# Patient Record
Sex: FEMALE | Race: WHITE | NOT HISPANIC OR LATINO | Employment: OTHER | URBAN - METROPOLITAN AREA
[De-identification: names, ages, dates, MRNs, and addresses within clinical notes are randomized per-mention and may not be internally consistent; named-entity substitution may affect disease eponyms.]

---

## 2017-01-05 ENCOUNTER — TELEPHONE (OUTPATIENT)
Dept: PRIMARY CARE CLINIC | Facility: CLINIC | Age: 82
End: 2017-01-05

## 2017-01-05 NOTE — TELEPHONE ENCOUNTER
----- Message from Viv Dunham sent at 1/3/2017  2:07 PM CST -----  Contact: [phone]  Patient called to schedule a hospital follow up from [facility].  Reason for visit: COPD, was unable to breathe   The patient was discharged on 12/29/16  Patient was advised to follow up, she requesting to be seen by 1/4/16, only time her daughter can bring her in for the appt.  There are no available appointments for when the patient due to be seen in the clinic.  Please contact patient for scheduling purposes at phone number [phone].  Thanks!

## 2017-01-06 ENCOUNTER — OFFICE VISIT (OUTPATIENT)
Dept: FAMILY MEDICINE | Facility: CLINIC | Age: 82
End: 2017-01-06
Payer: MEDICARE

## 2017-01-06 ENCOUNTER — CLINICAL SUPPORT (OUTPATIENT)
Dept: AUDIOLOGY | Facility: CLINIC | Age: 82
End: 2017-01-06
Payer: MEDICARE

## 2017-01-06 VITALS — OXYGEN SATURATION: 97 % | SYSTOLIC BLOOD PRESSURE: 130 MMHG | DIASTOLIC BLOOD PRESSURE: 74 MMHG | HEART RATE: 100 BPM

## 2017-01-06 DIAGNOSIS — J44.1 COPD EXACERBATION: Primary | ICD-10-CM

## 2017-01-06 PROCEDURE — 99999 PR PBB SHADOW E&M-EST. PATIENT-LVL IV: CPT | Mod: PBBFAC,,, | Performed by: NURSE PRACTITIONER

## 2017-01-06 PROCEDURE — 99214 OFFICE O/P EST MOD 30 MIN: CPT | Mod: PBBFAC,PO | Performed by: NURSE PRACTITIONER

## 2017-01-06 PROCEDURE — 99213 OFFICE O/P EST LOW 20 MIN: CPT | Mod: S$PBB,,, | Performed by: NURSE PRACTITIONER

## 2017-01-06 RX ORDER — AZELASTINE 1 MG/ML
1 SPRAY, METERED NASAL 2 TIMES DAILY
Qty: 30 ML | Refills: 0 | Status: SHIPPED | OUTPATIENT
Start: 2017-01-06 | End: 2017-01-06 | Stop reason: SDUPTHER

## 2017-01-06 RX ORDER — FLUTICASONE PROPIONATE 50 MCG
2 SPRAY, SUSPENSION (ML) NASAL DAILY
Qty: 16 G | Refills: 0 | Status: SHIPPED | OUTPATIENT
Start: 2017-01-06 | End: 2017-01-06 | Stop reason: SDUPTHER

## 2017-01-06 RX ORDER — FLUTICASONE PROPIONATE 50 MCG
2 SPRAY, SUSPENSION (ML) NASAL DAILY
Qty: 16 G | Refills: 0 | Status: SHIPPED | OUTPATIENT
Start: 2017-01-06 | End: 2017-01-17 | Stop reason: SDUPTHER

## 2017-01-06 RX ORDER — GUAIFENESIN 1200 MG/1
1200 TABLET, EXTENDED RELEASE ORAL 2 TIMES DAILY
Qty: 20 TABLET | Refills: 0 | COMMUNITY
Start: 2017-01-06 | End: 2017-01-16

## 2017-01-06 RX ORDER — ALLOPURINOL 100 MG/1
TABLET ORAL
Qty: 90 TABLET | Refills: 3 | Status: SHIPPED | OUTPATIENT
Start: 2017-01-06 | End: 2018-01-24 | Stop reason: SDUPTHER

## 2017-01-06 RX ORDER — AZELASTINE 1 MG/ML
1 SPRAY, METERED NASAL 2 TIMES DAILY
Qty: 30 ML | Refills: 0 | Status: SHIPPED | OUTPATIENT
Start: 2017-01-06 | End: 2017-01-17 | Stop reason: SDUPTHER

## 2017-01-06 RX ORDER — GUAIFENESIN 1200 MG/1
1200 TABLET, EXTENDED RELEASE ORAL 2 TIMES DAILY
Qty: 20 TABLET | Refills: 0 | COMMUNITY
Start: 2017-01-06 | End: 2017-01-06 | Stop reason: SDUPTHER

## 2017-01-06 NOTE — PROGRESS NOTES
Subjective:       Patient ID: Bel Oquendo is a 84 y.o. female.    Chief Complaint: Cough; Shortness of Breath; and Ear Fullness    HPI Comments: Patient was seen at the ED 12/29/16 at [facility] after having dry cough, congsetion, and shortness of breath. CXR showed no pneumonia, no fluid consolidation, and normal ECG. Treated with doxycycline and medrol dose pack. Improved, still some coughing. Still having some shortness of breath, still midlly worse due to her congestion. Chronic oxygen dependency due to sever COPD, Dr Acuña is Pulmonologist.     TETANUS VACCINE due on 08/06/1950  DEXA SCAN due on 01/13/2013    Past Medical History:  Past Medical History:    COPD (chronic obstructive pulmonary disease)                  COPD (chronic obstructive pulmonary disease)    5/4/2012      JIANG (dyspnea on exertion)                                     HEARING LOSS                                                    Comment:bilateral aids    Hypertension                                                  Mobility impaired                                               Comment:uses walker     Pericardial effusion                            07/2015         Comment:resolved    Supplemental oxygen dependent                                   Comment:3L O2 via [place]    Tobacco use                                     6/29/2012     Type II diabetes mellitus                                   Past Surgical History:    CEREBRAL ANEURYSM REPAIR                         1996            Comment:Dr. Mercado    CHOLECYSTECTOMY                                 N/A March 26,*    CATARACT EXTRACTION                             Left 2001          VAGINAL DELIVERY                                                 Comment:x7    DILATION AND CURETTAGE OF UTERUS                                 Comment:x2    PERICARDIOCENTESIS                                               Comment:effusion/tamponade  Review of patient's allergies indicates:  No Known  Allergies  Current Outpatient Prescriptions on File Prior to Visit:  aspirin (ECOTRIN) 81 MG EC tablet, Every day, Disp: , Rfl:   BD ULTRA-FINE II LANCETS 30 gauge Misc, USE ONCE DAILY, Disp: 100 each, Rfl: 3  CONTOUR NEXT STRIPS Strp, USE ONE STRIP DAILY TO TEST BLOOD SUGAR, Disp: 100 strip, Rfl: 0  fish oil-omega-3 fatty acids 300-1,000 mg capsule, Take 2 g by mouth once daily.  , Disp: , Rfl:   fluticasone-salmeterol 100-50 mcg/dose (ADVAIR) 100-50 mcg/dose diskus inhaler, Inhale 1 puff into the lungs 2 (two) times daily., Disp: 180 each, Rfl: 3  furosemide (LASIX) 20 MG tablet, TAKE 1 TABLET DAILY, Disp: 90 tablet, Rfl: 2  glipiZIDE (GLUCOTROL) 5 MG TR24, TAKE 1 TABLET DAILY WITH BREAKFAST, Disp: 90 tablet, Rfl: 3  GLUCOSAM HCL/MSM/CHONDRO SCRUGGS A (GLUCOSAMINE HCL-MSM-CHONDROITN) 400-200-333 mg Tab, Every day, Disp: , Rfl:   ketorolac 0.5% (ACULAR) 0.5 % Drop, Place 1 drop into the right eye 3 (three) times daily., Disp: 5 mL, Rfl: 1  lisinopril (PRINIVIL,ZESTRIL) 2.5 MG tablet, Take 2.5 mg by mouth once daily., Disp: , Rfl:   methylPREDNISolone (MEDROL DOSEPACK) 4 mg tablet, Take as directed, Disp: 1 Package, Rfl: 0  MILK THISTLE ORAL, Take 1 tablet by mouth once daily. , Disp: , Rfl:   multivitamin (THERAGRAN) per tablet, Take 1 tablet by mouth once daily., Disp: , Rfl:   nystatin (MYCOSTATIN) powder, Apply topically 4 (four) times daily., Disp: 60 g, Rfl: 2  pantoprazole (PROTONIX) 40 MG tablet, TAKE 1 TABLET DAILY, Disp: 90 tablet, Rfl: 1  pravastatin (PRAVACHOL) 40 MG tablet, TAKE 1 TABLET DAILY, Disp: 90 tablet, Rfl: 2  PROAIR HFA 90 mcg/actuation inhaler, USE 1 TO 2 INHALATIONS EVERY 6 HOURS AS NEEDED FOR WHEEZING OR SHORTNESS OF BREATH, Disp: 25.5 g, Rfl: 1  SPIRIVA WITH HANDIHALER 18 mcg inhalation capsule, INHALE THE CONTENTS OF 1 CAPSULE DAILY, Disp: 90 capsule, Rfl: 3  vitamin D 1000 units Tab, , Disp: , Rfl: 3  triamcinolone acetonide 0.1% (KENALOG) 0.1 % cream, Apply topically 2 (two) times daily.,  Disp: 45 g, Rfl: 0  (DISCONTINUED) allopurinol (ZYLOPRIM) 100 MG tablet, TAKE 1 TABLET DAILY, Disp: 90 tablet, Rfl: 1  (DISCONTINUED) doxycycline (VIBRAMYCIN) 100 MG Cap, Take 1 capsule (100 mg total) by mouth 2 (two) times daily., Disp: 14 capsule, Rfl: 0    No current facility-administered medications on file prior to visit.     Social History    Marital status:              Spouse name:                       Years of education:                 Number of children: 6             Occupational History    None on file    Social History Main Topics    Smoking status: Former Smoker                                                                Packs/day: 0.50      Years: 0.00           Types: Cigarettes       Quit date: 12/27/2012    Smokeless status: Never Used                        Comment: quit 2 week ago    Alcohol use: No                 Comment: Minimal exposure    Drug use: No              Sexual activity: No                   Other Topics            Concern    None on file    Social History Narrative    Lives at home, independently.   Home oxygen and walker.      Review of patient's family history indicates:    Heart disease                  Father                          Review of Systems   Constitutional: Positive for fatigue. Negative for chills and fever.   HENT: Positive for postnasal drip and rhinorrhea.    Respiratory: Positive for cough and shortness of breath. Negative for chest tightness, wheezing and stridor.    Cardiovascular: Negative.  Negative for chest pain, palpitations and leg swelling.   Gastrointestinal: Negative.  Negative for diarrhea, nausea and vomiting.       Objective:      Physical Exam   Constitutional: She is oriented to person, place, and time. No distress.   HENT:   Head: Normocephalic and atraumatic.   Eyes: Pupils are equal, round, and reactive to light.   Cardiovascular: Normal rate and regular rhythm.    Pulmonary/Chest: She has wheezes.   Oxygen depenedent    Abdominal: Soft. Bowel sounds are normal. She exhibits no distension. There is no tenderness.   Musculoskeletal: She exhibits no edema.   Neurological: She is alert and oriented to person, place, and time.   Skin: She is not diaphoretic. No erythema.   Psychiatric: She has a normal mood and affect. Her behavior is normal.   Vitals reviewed.      Assessment:       1. COPD exacerbation        Plan:       Symptoms improving. Treat with Astelin and Flonase for sinus drip and drainage, add mucinex, continue Spirva.

## 2017-01-06 NOTE — MR AVS SNAPSHOT
[facility]  [address]  [facility] [place] [address]  Phone: [phone]  Fax: [phone]                  Bel Oquendo   2017 2:00 PM   Office Visit    Description:  Female : 1932   Provider:  Cinthia Vizcarra NP   Department:  [facility]           Reason for Visit     Cough     Shortness of Breath     Ear Fullness           Diagnoses this Visit        Comments    COPD exacerbation    -  Primary            To Do List           Goals (5 Years of Data)              Today    16    Blood Pressure < 130/80   130/74  157/68  165/66    HEMOGLOBIN A1C < 7.0              These Medications        Disp Refills Start End    azelastine (ASTELIN) 137 mcg (0.1 %) nasal spray 30 mL 0 2017    1 spray (137 mcg total) by Nasal route 2 (two) times daily. - Nasal    Pharmacy: [facility] [address] [place] [address] AT [place] [address] & [place] [address] Ph #: [phone]       fluticasone (FLONASE) 50 mcg/actuation nasal spray 16 g 0 2017     2 sprays by Each Nare route once daily. - Each Nare    Pharmacy: [facility] [address] [place] [address] AT [facility] & [place] [address] Ph #: [phone]         PURCHASE these Medications (No prescription required)        Start End    guaifenesin (MUCINEX) 1,200 mg Ta12 2017    Sig - Route: Take 1,200 mg by mouth 2 (two) times daily. - Oral    Class: OTC      [facility]     [facility] On Call Nurse Care Line -  Assistance  Registered nurses in the [facility] On Call Center provide clinical advisement, health education, appointment booking, and other advisory services.  Call for this free service at [phone].             Medications           Message regarding Medications     Verify the changes and/or additions to your medication regime listed below are the same as discussed with your clinician today.  If any of these changes or additions are  incorrect, please notify your healthcare provider.        START taking these NEW medications        Refills    azelastine (ASTELIN) 137 mcg (0.1 %) nasal spray 0    Si spray (137 mcg total) by Nasal route 2 (two) times daily.    Class: Normal    Route: Nasal    fluticasone (FLONASE) 50 mcg/actuation nasal spray 0    Si sprays by Each Nare route once daily.    Class: Normal    Route: Each Nare    guaifenesin (MUCINEX) 1,200 mg Ta12 0    Sig: Take 1,200 mg by mouth 2 (two) times daily.    Class: OTC    Route: Oral      STOP taking these medications     doxycycline (VIBRAMYCIN) 100 MG Cap Take 1 capsule (100 mg total) by mouth 2 (two) times daily.    methylPREDNISolone (MEDROL DOSEPACK) 4 mg tablet Take as directed           Verify that the below list of medications is an accurate representation of the medications you are currently taking.  If none reported, the list may be blank. If incorrect, please contact your healthcare provider. Carry this list with you in case of emergency.           Current Medications     allopurinol (ZYLOPRIM) 100 MG tablet TAKE 1 TABLET DAILY    aspirin (ECOTRIN) 81 MG EC tablet Every day    BD ULTRA-FINE II LANCETS 30 gauge Misc USE ONCE DAILY    CONTOUR NEXT STRIPS Strp USE ONE STRIP DAILY TO TEST BLOOD SUGAR    fish oil-omega-3 fatty acids 300-1,000 mg capsule Take 2 g by mouth once daily.      fluticasone-salmeterol 100-50 mcg/dose (ADVAIR) 100-50 mcg/dose diskus inhaler Inhale 1 puff into the lungs 2 (two) times daily.    furosemide (LASIX) 20 MG tablet TAKE 1 TABLET DAILY    glipiZIDE (GLUCOTROL) 5 MG TR24 TAKE 1 TABLET DAILY WITH BREAKFAST    GLUCOSAM HCL/MSM/CHONDRO SCRUGGS A (GLUCOSAMINE HCL-MSM-CHONDROITN) 400-200-333 mg Tab Every day    ketorolac 0.5% (ACULAR) 0.5 % Drop Place 1 drop into the right eye 3 (three) times daily.    lisinopril (PRINIVIL,ZESTRIL) 2.5 MG tablet Take 2.5 mg by mouth once daily.    MILK THISTLE ORAL Take 1 tablet by mouth once daily.     multivitamin  (THERAGRAN) per tablet Take 1 tablet by mouth once daily.    nystatin (MYCOSTATIN) powder Apply topically 4 (four) times daily.    pantoprazole (PROTONIX) 40 MG tablet TAKE 1 TABLET DAILY    pravastatin (PRAVACHOL) 40 MG tablet TAKE 1 TABLET DAILY    PROAIR HFA 90 mcg/actuation inhaler USE 1 TO 2 INHALATIONS EVERY 6 HOURS AS NEEDED FOR WHEEZING OR SHORTNESS OF BREATH    SPIRIVA WITH HANDIHALER 18 mcg inhalation capsule INHALE THE CONTENTS OF 1 CAPSULE DAILY    vitamin D 1000 units Tab     azelastine (ASTELIN) 137 mcg (0.1 %) nasal spray 1 spray (137 mcg total) by Nasal route 2 (two) times daily.    fluticasone (FLONASE) 50 mcg/actuation nasal spray 2 sprays by Each Nare route once daily.    guaifenesin (MUCINEX) 1,200 mg Ta12 Take 1,200 mg by mouth 2 (two) times daily.    triamcinolone acetonide 0.1% (KENALOG) 0.1 % cream Apply topically 2 (two) times daily.           Clinical Reference Information           Vital Signs - Last Recorded  Most recent update: 1/6/2017  1:57 PM by Chetna Cooper LPN    BP Pulse                130/74 100          Blood Pressure          Most Recent Value    BP  130/74      Allergies as of 1/6/2017     No Known Allergies      Immunizations Administered on Date of Encounter - 1/6/2017     None

## 2017-01-09 NOTE — PROGRESS NOTES
The patient was seen in the hearing aid clinic accompanied by her son.  The patient reported both of her hearing aids were not working.  A listening check of each aid indicated the hearing aids would turn on, but not provide amplification.  [facility]  The patient requested a RUSH be placed on the repair.  A loaner hearing aid for each ear was programmed and issued to the patient for her to use while her hearing aids are out for repair.  The patient was instructed on care and use of the loaner DERICK hearing aids.  The patient should be contacted upon receipt of her repaired hearing aids.

## 2017-01-13 ENCOUNTER — CLINICAL SUPPORT (OUTPATIENT)
Dept: AUDIOLOGY | Facility: CLINIC | Age: 82
End: 2017-01-13

## 2017-01-13 PROCEDURE — V5299 HEARING SERVICE: HCPCS | Mod: CSM,,, | Performed by: AUDIOLOGIST

## 2017-01-13 NOTE — PROGRESS NOTES
Patient was seen in the hearing aid clinic, after picking up her repaired hearing aids.  Patient reported she could not hear with her repaired hearing aids.  Listening check of the right and left aid revealed each aid to sound good.  Tympanometry revealed Type B tympanogram AU.  A medical evaluation was recommended.  The patient scheduled an appointment with our nurse practitioner.  The programming on the right and left hearing aid was changed to allow for increased gain in the hearing aids.  The programming on the left and right hearing aid should be recalculated after medical follow-up.  The patient was informed of this recommendation.

## 2017-01-13 NOTE — PROGRESS NOTES
The patient was not seen in the clinic at this time.  Patient picked up repaired hearing aids and paid RUSH fee, at .

## 2017-01-17 ENCOUNTER — OFFICE VISIT (OUTPATIENT)
Dept: FAMILY MEDICINE | Facility: CLINIC | Age: 82
End: 2017-01-17
Payer: MEDICARE

## 2017-01-17 VITALS
WEIGHT: 199 LBS | OXYGEN SATURATION: 94 % | DIASTOLIC BLOOD PRESSURE: 60 MMHG | HEIGHT: 62 IN | BODY MASS INDEX: 36.62 KG/M2 | HEART RATE: 70 BPM | SYSTOLIC BLOOD PRESSURE: 108 MMHG

## 2017-01-17 DIAGNOSIS — H91.90 DECREASED HEARING, UNSPECIFIED LATERALITY: Primary | ICD-10-CM

## 2017-01-17 PROCEDURE — 99213 OFFICE O/P EST LOW 20 MIN: CPT | Mod: S$PBB,,, | Performed by: NURSE PRACTITIONER

## 2017-01-17 PROCEDURE — 99214 OFFICE O/P EST MOD 30 MIN: CPT | Mod: PBBFAC,PO | Performed by: NURSE PRACTITIONER

## 2017-01-17 PROCEDURE — 99999 PR PBB SHADOW E&M-EST. PATIENT-LVL IV: CPT | Mod: PBBFAC,,, | Performed by: NURSE PRACTITIONER

## 2017-01-17 RX ORDER — FLUTICASONE PROPIONATE 50 MCG
2 SPRAY, SUSPENSION (ML) NASAL DAILY
Qty: 16 G | Refills: 0 | Status: SHIPPED | OUTPATIENT
Start: 2017-01-17 | End: 2017-01-27 | Stop reason: SDUPTHER

## 2017-01-17 RX ORDER — FLUTICASONE PROPIONATE 50 MCG
2 SPRAY, SUSPENSION (ML) NASAL DAILY
Qty: 16 G | Refills: 0 | Status: SHIPPED | OUTPATIENT
Start: 2017-01-17 | End: 2017-01-17 | Stop reason: SDUPTHER

## 2017-01-17 RX ORDER — AZELASTINE 1 MG/ML
1 SPRAY, METERED NASAL 2 TIMES DAILY
Qty: 30 ML | Refills: 0 | Status: SHIPPED | OUTPATIENT
Start: 2017-01-17 | End: 2017-01-27 | Stop reason: SDUPTHER

## 2017-01-17 RX ORDER — AZELASTINE 1 MG/ML
1 SPRAY, METERED NASAL 2 TIMES DAILY
Qty: 30 ML | Refills: 0 | Status: SHIPPED | OUTPATIENT
Start: 2017-01-17 | End: 2017-01-17 | Stop reason: SDUPTHER

## 2017-01-17 NOTE — PROGRESS NOTES
Subjective:       Patient ID: Bel Oquendo is a 84 y.o. female.    Chief Complaint: Fluid in ears (Has been to Audiology)    HPI Comments: Patient has had decreased hearing and feeling of fullness in her ears since she was traeted for sinus and chest congestion on 12/29/16. She was seen by audiology on 1/6/17 and again on 1/13/17 due to inability to hear even after hearting aids were adjusted. She has an appointment scheduled with ENT on 1/27/17.  She says she was told to follow up anna PCP. She is using flonase and astelin and mucinex daily, she says that her sinus congestion has resolved but her hearing is not much better.  TETANUS VACCINE due on 08/06/1950  DEXA SCAN due on 01/13/2013    Past Medical History:  Past Medical History:    COPD (chronic obstructive pulmonary disease)                  COPD (chronic obstructive pulmonary disease)    5/4/2012      JIANG (dyspnea on exertion)                                     HEARING LOSS                                                    Comment:bilateral aids    Hypertension                                                  Mobility impaired                                               Comment:uses walker     Pericardial effusion                            07/2015         Comment:resolved    Supplemental oxygen dependent                                   Comment:3L O2 via [place]    Tobacco use                                     6/29/2012     Type II diabetes mellitus                                   Past Surgical History:    CEREBRAL ANEURYSM REPAIR                         1996            Comment:Dr. Mercado    CHOLECYSTECTOMY                                 N/A March 26,*    CATARACT EXTRACTION                             Left 2001          VAGINAL DELIVERY                                                 Comment:x7    DILATION AND CURETTAGE OF UTERUS                                 Comment:x2    PERICARDIOCENTESIS                                                Comment:effusion/tamponade  Review of patient's allergies indicates:  No Known Allergies  Current Outpatient Prescriptions on File Prior to Visit:  allopurinol (ZYLOPRIM) 100 MG tablet, TAKE 1 TABLET DAILY, Disp: 90 tablet, Rfl: 3  aspirin (ECOTRIN) 81 MG EC tablet, Every day, Disp: , Rfl:   BD ULTRA-FINE II LANCETS 30 gauge Misc, USE ONCE DAILY, Disp: 100 each, Rfl: 3  CONTOUR NEXT STRIPS Strp, USE ONE STRIP DAILY TO TEST BLOOD SUGAR, Disp: 100 strip, Rfl: 0  fish oil-omega-3 fatty acids 300-1,000 mg capsule, Take 2 g by mouth once daily.  , Disp: , Rfl:   fluticasone-salmeterol 100-50 mcg/dose (ADVAIR) 100-50 mcg/dose diskus inhaler, Inhale 1 puff into the lungs 2 (two) times daily., Disp: 180 each, Rfl: 3  furosemide (LASIX) 20 MG tablet, TAKE 1 TABLET DAILY, Disp: 90 tablet, Rfl: 2  glipiZIDE (GLUCOTROL) 5 MG TR24, TAKE 1 TABLET DAILY WITH BREAKFAST, Disp: 90 tablet, Rfl: 3  GLUCOSAM HCL/MSM/CHONDRO SCRUGGS A (GLUCOSAMINE HCL-MSM-CHONDROITN) 400-200-333 mg Tab, Every day, Disp: , Rfl:   ketorolac 0.5% (ACULAR) 0.5 % Drop, Place 1 drop into the right eye 3 (three) times daily., Disp: 5 mL, Rfl: 1  lisinopril (PRINIVIL,ZESTRIL) 2.5 MG tablet, Take 2.5 mg by mouth once daily., Disp: , Rfl:   MILK THISTLE ORAL, Take 1 tablet by mouth once daily. , Disp: , Rfl:   multivitamin (THERAGRAN) per tablet, Take 1 tablet by mouth once daily., Disp: , Rfl:   nystatin (MYCOSTATIN) powder, Apply topically 4 (four) times daily., Disp: 60 g, Rfl: 2  pantoprazole (PROTONIX) 40 MG tablet, TAKE 1 TABLET DAILY, Disp: 90 tablet, Rfl: 1  pravastatin (PRAVACHOL) 40 MG tablet, TAKE 1 TABLET DAILY, Disp: 90 tablet, Rfl: 2  PROAIR HFA 90 mcg/actuation inhaler, USE 1 TO 2 INHALATIONS EVERY 6 HOURS AS NEEDED FOR WHEEZING OR SHORTNESS OF BREATH, Disp: 25.5 g, Rfl: 1  SPIRIVA WITH HANDIHALER 18 mcg inhalation capsule, INHALE THE CONTENTS OF 1 CAPSULE DAILY, Disp: 90 capsule, Rfl: 3  vitamin D 1000 units Tab, , Disp: , Rfl: 3  (DISCONTINUED)  azelastine (ASTELIN) 137 mcg (0.1 %) nasal spray, 1 spray (137 mcg total) by Nasal route 2 (two) times daily., Disp: 30 mL, Rfl: 0  (DISCONTINUED) fluticasone (FLONASE) 50 mcg/actuation nasal spray, 2 sprays by Each Nare route once daily., Disp: 16 g, Rfl: 0  () guaifenesin (MUCINEX) 1,200 mg Ta12, Take 1,200 mg by mouth 2 (two) times daily., Disp: 20 tablet, Rfl: 0  triamcinolone acetonide 0.1% (KENALOG) 0.1 % cream, Apply topically 2 (two) times daily., Disp: 45 g, Rfl: 0    No current facility-administered medications on file prior to visit.     Social History    Marital status:              Spouse name:                       Years of education:                 Number of children: 6             Occupational History    None on file    Social History Main Topics    Smoking status: Former Smoker                                                                Packs/day: 0.50      Years: 0.00           Types: Cigarettes       Quit date: 2012    Smokeless status: Never Used                        Comment: quit 2 week ago    Alcohol use: No                 Comment: Minimal exposure    Drug use: No              Sexual activity: No                   Other Topics            Concern    None on file    Social History Narrative    Lives at home, independently.   Home oxygen and walker.      Review of patient's family history indicates:    Heart disease                  Father                          Review of Systems   Constitutional: Negative for chills and fever.   HENT: Positive for hearing loss. Negative for ear pain.    Neurological: Negative for dizziness, light-headedness and headaches.       Objective:      Physical Exam   Constitutional: She is oriented to person, place, and time. No distress.   HENT:   Head: Normocephalic and atraumatic. Head is without raccoon's eyes.   Right Ear: No swelling or tenderness. Tympanic membrane is not erythematous. No middle ear effusion.   Left Ear: No swelling or  tenderness. Tympanic membrane is not erythematous. A middle ear effusion is present.   Nose: No mucosal edema or rhinorrhea. Right sinus exhibits no maxillary sinus tenderness and no frontal sinus tenderness. Left sinus exhibits no maxillary sinus tenderness and no frontal sinus tenderness.   Mouth/Throat: No posterior oropharyngeal erythema.   Cardiovascular: Normal rate.    Pulmonary/Chest: Effort normal.   Oxygen dependent   Neurological: She is alert and oriented to person, place, and time.   Skin: No rash noted. She is not diaphoretic.   Psychiatric: She has a normal mood and affect. Her behavior is normal.   Vitals reviewed.      Assessment:       1. Decreased hearing, unspecified laterality        Plan:       1. Decreased hearing, unspecified laterality  Continue astelin, flonase, and mucinex. Follow up with ENT as scheduled.

## 2017-01-17 NOTE — MR AVS SNAPSHOT
[facility]  [address] [facility] [address]  [facility] [address]  Phone: [phone]  Fax: [phone]                  Bel Oquendo   2017 1:00 PM   Office Visit    Description:  Female : 1932   Provider:  Cinthia Vizcarra NP   Department:  [facility]           Reason for Visit     Fluid in ears                To Do List           Future Appointments        Provider Department Dept Phone    2017 10:40 AM Liya Oneill NP [place] - [phone]    2017 11:00 AM AUDIO, [facility] Audiology [phone]    2017 1:00 PM HEARING, [facility] Hearing Aids [phone]      Goals (5 Years of Data)              Today    17    Blood Pressure < 130/80   108/60  130/74  157/68    HEMOGLOBIN A1C < 7.0              These Medications        Disp Refills Start End    azelastine (ASTELIN) 137 mcg (0.1 %) nasal spray 30 mL 0 2017    1 spray (137 mcg total) by Nasal route 2 (two) times daily. - Nasal    Pharmacy: [facility] [address] [place] [address] AT [facility] [address] [facility] Ph #: [phone]       fluticasone (FLONASE) 50 mcg/actuation nasal spray 16 g 0 2017     2 sprays by Each Nare route once daily. - Each Nare    Pharmacy: [facility] [address] [place] [address] AT [facility] Ph #: [phone]         [facility] On [facility]     [facility] -  Assistance  Registered nurses in the [facility] provide clinical advisement, health education, appointment booking, and other advisory services.  Call for this free service at [phone].             Medications           Message regarding Medications     Verify the changes and/or additions to your medication regime listed below are the same as discussed with your clinician today.  If any of these changes or additions are incorrect, please notify your healthcare  provider.             Verify that the below list of medications is an accurate representation of the medications you are currently taking.  If none reported, the list may be blank. If incorrect, please contact your healthcare provider. Carry this list with you in case of emergency.           Current Medications     allopurinol (ZYLOPRIM) 100 MG tablet TAKE 1 TABLET DAILY    aspirin (ECOTRIN) 81 MG EC tablet Every day    azelastine (ASTELIN) 137 mcg (0.1 %) nasal spray 1 spray (137 mcg total) by Nasal route 2 (two) times daily.    BD ULTRA-FINE II LANCETS 30 gauge Misc USE ONCE DAILY    CONTOUR NEXT STRIPS Strp USE ONE STRIP DAILY TO TEST BLOOD SUGAR    fish oil-omega-3 fatty acids 300-1,000 mg capsule Take 2 g by mouth once daily.      fluticasone (FLONASE) 50 mcg/actuation nasal spray 2 sprays by Each Nare route once daily.    fluticasone-salmeterol 100-50 mcg/dose (ADVAIR) 100-50 mcg/dose diskus inhaler Inhale 1 puff into the lungs 2 (two) times daily.    furosemide (LASIX) 20 MG tablet TAKE 1 TABLET DAILY    glipiZIDE (GLUCOTROL) 5 MG TR24 TAKE 1 TABLET DAILY WITH BREAKFAST    GLUCOSAM HCL/MSM/CHONDRO SCRUGGS A (GLUCOSAMINE HCL-MSM-CHONDROITN) 400-200-333 mg Tab Every day    ketorolac 0.5% (ACULAR) 0.5 % Drop Place 1 drop into the right eye 3 (three) times daily.    lisinopril (PRINIVIL,ZESTRIL) 2.5 MG tablet Take 2.5 mg by mouth once daily.    MILK THISTLE ORAL Take 1 tablet by mouth once daily.     multivitamin (THERAGRAN) per tablet Take 1 tablet by mouth once daily.    nystatin (MYCOSTATIN) powder Apply topically 4 (four) times daily.    pantoprazole (PROTONIX) 40 MG tablet TAKE 1 TABLET DAILY    pravastatin (PRAVACHOL) 40 MG tablet TAKE 1 TABLET DAILY    PROAIR HFA 90 mcg/actuation inhaler USE 1 TO 2 INHALATIONS EVERY 6 HOURS AS NEEDED FOR WHEEZING OR SHORTNESS OF BREATH    SPIRIVA WITH HANDIHALER 18 mcg inhalation capsule INHALE THE CONTENTS OF 1 CAPSULE DAILY    vitamin D 1000 units Tab     triamcinolone  "acetonide 0.1% (KENALOG) 0.1 % cream Apply topically 2 (two) times daily.           Clinical Reference Information           Vital Signs - Last Recorded  Most recent update: 1/17/2017  1:09 PM by Krissy L. Sandifer, LPN    BP Pulse Ht Wt SpO2 BMI    108/60 70 5' 2" (1.575 m) 90.3 kg (199 lb) (!) 94% 36.4 kg/m2      Blood Pressure          Most Recent Value    BP  108/60      Allergies as of 1/17/2017     No Known Allergies      Immunizations Administered on Date of Encounter - 1/17/2017     None      "

## 2017-01-27 ENCOUNTER — OFFICE VISIT (OUTPATIENT)
Dept: OTOLARYNGOLOGY | Facility: CLINIC | Age: 82
End: 2017-01-27
Payer: MEDICARE

## 2017-01-27 VITALS — HEIGHT: 61 IN

## 2017-01-27 DIAGNOSIS — Z97.4 WEARS HEARING AID: ICD-10-CM

## 2017-01-27 DIAGNOSIS — H61.23 BILATERAL IMPACTED CERUMEN: ICD-10-CM

## 2017-01-27 DIAGNOSIS — H69.92 EUSTACHIAN TUBE DYSFUNCTION, LEFT: ICD-10-CM

## 2017-01-27 DIAGNOSIS — H65.192 ACUTE NON-SUPPURATIVE OTITIS MEDIA, LEFT: Primary | ICD-10-CM

## 2017-01-27 DIAGNOSIS — H90.3 SENSORINEURAL HEARING LOSS (SNHL) OF BOTH EARS: ICD-10-CM

## 2017-01-27 PROCEDURE — 99999 PR PBB SHADOW E&M-EST. PATIENT-LVL IV: CPT | Mod: PBBFAC,,, | Performed by: NURSE PRACTITIONER

## 2017-01-27 PROCEDURE — 69210 REMOVE IMPACTED EAR WAX UNI: CPT | Mod: S$PBB,,, | Performed by: NURSE PRACTITIONER

## 2017-01-27 PROCEDURE — 99214 OFFICE O/P EST MOD 30 MIN: CPT | Mod: PBBFAC,PO | Performed by: NURSE PRACTITIONER

## 2017-01-27 PROCEDURE — 69210 REMOVE IMPACTED EAR WAX UNI: CPT | Mod: PBBFAC,PO | Performed by: NURSE PRACTITIONER

## 2017-01-27 PROCEDURE — 99213 OFFICE O/P EST LOW 20 MIN: CPT | Mod: 25,S$PBB,, | Performed by: NURSE PRACTITIONER

## 2017-01-27 RX ORDER — AZELASTINE 1 MG/ML
1 SPRAY, METERED NASAL 2 TIMES DAILY
Qty: 30 ML | Refills: 12 | Status: ON HOLD | OUTPATIENT
Start: 2017-01-27 | End: 2020-08-19 | Stop reason: HOSPADM

## 2017-01-27 RX ORDER — FLUTICASONE PROPIONATE 50 MCG
1 SPRAY, SUSPENSION (ML) NASAL 2 TIMES DAILY
Qty: 16 G | Refills: 12 | Status: SHIPPED | OUTPATIENT
Start: 2017-01-27 | End: 2018-01-27

## 2017-01-27 NOTE — PROGRESS NOTES
Subjective:       Patient ID: Bel Oquendo is a 84 y.o. female.    Chief Complaint: Ear F/u    HPI  Patient was last seen 8 months ago for cerumen impactions and hearing aid conerns. She wears bilateral ITE hearing aids AU and returns periodically for ear cleaning. She denies otalgia or otorrhea. She denies other ENT complaints at this time. She wears continuous oxygen therapy via nasal cannula. She is in a wheelchair accompanied by her son. She was told by audiologist that she has fluid behind her left ear, so she saw ROMINA Vizcarra 10 days ago with left ANTONINA treated with Flonase, Astelin, and Mucinex. It is improving but not resolved. No otalgia or otorrhea.     Review of Systems   Constitutional: Negative.  Negative for fever and fatigue.   HENT: Positive for hearing loss. Negative for ear pain, facial swelling, neck pain and ear discharge.    Eyes: Negative.    Respiratory: Negative for wheezing or cough.    Cardiovascular: Negative.    Gastrointestinal: Negative.    Musculoskeletal: Negative.    Skin: Negative.  Negative for pallor and rash.   Neurological: Negative.    Psychiatric/Behavioral: Negative.        Objective:      Physical Exam   Nursing note and vitals reviewed.  Constitutional: She is oriented to person, place, and time. Vital signs are normal. She appears well-developed and well-nourished. She is cooperative. She does not appear ill. No distress.        Obese, wheelchair, oxygen via nasal cannula   HENT:   Head: Normocephalic and atraumatic.     SEPARATE PROCEDURE IN OFFICE:   Procedure: Removal of impacted cerumen, bilateral   Pre Procedure Diagnosis: Cerumen Impaction   Post Procedure Diagnosis: Cerumen Impaction   Verbal informed consent in regards to risk of trauma to ear canal, ear drum or hearing, discomfort during procedure and/or inability to remove cerumen impaction in one session or unforeseen events or complications.   No anesthesia.     Procedure in detail:   Ear canal visualized bilateral  with appropriate size ear speculum utilizing Operating Head Binocular Otomicroscope   Utilizing the following: Ring curet AU. The impacted cerumen of the ear canals was removed atraumatically. The TM and EAC were then inspected and found to be clear of wax. See description of TMs/EACs in PE above.   Complications: No   Condition: Improved/Good    Right Ear: External ear and ear canal normal. No drainage. Tympanic membrane is not erythematous. No middle ear effusion.  Left Ear: External ear and ear canal normal. No drainage. Tympanic membrane is not erythematous. Dana serous/non-suppurative middle ear effusion.  Nose: Nose normal. No mucosal edema, rhinorrhea or septal deviation. Right sinus exhibits no maxillary sinus tenderness and no frontal sinus tenderness. Left sinus exhibits no maxillary sinus tenderness and no frontal sinus tenderness.   Mouth/Throat: Uvula is midline, oropharynx is clear and moist and mucous membranes are normal. Mucous membranes are not pale, not dry and not cyanotic. No oral lesions. No posterior oropharyngeal edema or posterior oropharyngeal erythema.   Eyes: Conjunctivae are normal. Pupils are equal, round, and reactive to light. Right eye exhibits no discharge. Left eye exhibits no discharge. No scleral icterus.   Neck: Trachea normal and normal range of motion. Neck supple. No tracheal deviation present. No mass and no thyromegaly present.   Pulmonary/Chest: Effort normal. No respiratory distress. She has no wheezes.   Musculoskeletal: Normal range of motion.   Lymphadenopathy:        Head (right side): No submental, no preauricular and no posterior auricular adenopathy present.        Head (left side): No submental, no preauricular and no posterior auricular adenopathy present.     She has no cervical adenopathy.   Neurological: She is alert and oriented to person, place, and time.   Skin: Skin is warm, dry and intact. No lesion and no rash noted. She is not diaphoretic. No erythema.  No pallor.   Psychiatric: She has a normal mood and affect. Her speech is normal and behavior is normal. Judgment and thought content normal. Cognition and memory are normal.       Assessment:      Cerumen impactions removed AU  SNHL AU, wears hearing aids AU  Left non-suppurative OME  Plan:     Flonase & Astelin BID. Nasal valsalva several times per day.   Audiologist will need to turn left hearing aid down once fluid is resolved.  If fluid persists X 6-8 weeks, patient is encouraged to return for NP scope exam and discussion of possible tympanostomy tube AS.    Return as needed for further ENT concerns

## 2017-02-02 RX ORDER — ALBUTEROL SULFATE 90 UG/1
AEROSOL, METERED RESPIRATORY (INHALATION)
Qty: 25.5 G | Refills: 3 | Status: SHIPPED | OUTPATIENT
Start: 2017-02-02 | End: 2017-12-02 | Stop reason: SDUPTHER

## 2017-02-24 DIAGNOSIS — E11.9 TYPE 2 DIABETES MELLITUS WITHOUT COMPLICATION: ICD-10-CM

## 2017-03-02 DIAGNOSIS — E11.9 TYPE 2 DIABETES MELLITUS WITHOUT COMPLICATION: ICD-10-CM

## 2017-03-23 DIAGNOSIS — E11.40 DIABETES MELLITUS WITH NEUROPATHY: ICD-10-CM

## 2017-03-23 RX ORDER — BLOOD SUGAR DIAGNOSTIC
STRIP MISCELLANEOUS
Qty: 100 STRIP | Refills: 2 | Status: SHIPPED | OUTPATIENT
Start: 2017-03-23 | End: 2020-10-05 | Stop reason: SDUPTHER

## 2017-04-27 ENCOUNTER — TELEPHONE (OUTPATIENT)
Dept: AUDIOLOGY | Facility: CLINIC | Age: 82
End: 2017-04-27

## 2017-04-27 ENCOUNTER — CLINICAL SUPPORT (OUTPATIENT)
Dept: AUDIOLOGY | Facility: CLINIC | Age: 82
End: 2017-04-27

## 2017-04-27 DIAGNOSIS — Z97.4 WEARS HEARING AID: Primary | ICD-10-CM

## 2017-04-27 PROCEDURE — V5299 HEARING SERVICE: HCPCS | Mod: CSM,,, | Performed by: AUDIOLOGIST

## 2017-04-27 NOTE — TELEPHONE ENCOUNTER
Informed pt that her hearing aid warranty  but that [employer] will let her renew it for one more year. Qtd. Pt $125/aid and told her that the new warranty expiration date will be 3/4/18. The warranty cannot be renewed after that since the maximum time frame will be reached as of 3/5/18.

## 2017-05-27 DIAGNOSIS — K21.9 GASTROESOPHAGEAL REFLUX DISEASE, ESOPHAGITIS PRESENCE NOT SPECIFIED: Primary | ICD-10-CM

## 2017-05-27 RX ORDER — PANTOPRAZOLE SODIUM 40 MG/1
TABLET, DELAYED RELEASE ORAL
Qty: 90 TABLET | Refills: 3 | Status: SHIPPED | OUTPATIENT
Start: 2017-05-27 | End: 2018-09-04 | Stop reason: SDUPTHER

## 2017-06-01 ENCOUNTER — LAB VISIT (OUTPATIENT)
Dept: LAB | Facility: HOSPITAL | Age: 82
End: 2017-06-01
Attending: EMERGENCY MEDICINE
Payer: MEDICARE

## 2017-06-01 DIAGNOSIS — I10 ESSENTIAL HYPERTENSION: Primary | ICD-10-CM

## 2017-06-01 DIAGNOSIS — E11.9 TYPE 2 DIABETES MELLITUS: ICD-10-CM

## 2017-06-01 DIAGNOSIS — N18.30 CKD (CHRONIC KIDNEY DISEASE), STAGE III: ICD-10-CM

## 2017-06-01 DIAGNOSIS — E55.9 VITAMIN D DEFICIENCY: ICD-10-CM

## 2017-06-01 DIAGNOSIS — E61.1 IRON DEFICIENCY: ICD-10-CM

## 2017-06-01 LAB
25(OH)D3+25(OH)D2 SERPL-MCNC: 52 NG/ML
ALBUMIN SERPL BCP-MCNC: 3 G/DL
ANION GAP SERPL CALC-SCNC: 9 MMOL/L
BUN SERPL-MCNC: 20 MG/DL
CALCIUM SERPL-MCNC: 9.6 MG/DL
CHLORIDE SERPL-SCNC: 97 MMOL/L
CO2 SERPL-SCNC: 34 MMOL/L
CREAT SERPL-MCNC: 1.4 MG/DL
EST. GFR  (AFRICAN AMERICAN): 39.8 ML/MIN/1.73 M^2
EST. GFR  (NON AFRICAN AMERICAN): 34.5 ML/MIN/1.73 M^2
GLUCOSE SERPL-MCNC: 250 MG/DL
HCT VFR BLD AUTO: 38.4 %
PHOSPHATE SERPL-MCNC: 3.2 MG/DL
POTASSIUM SERPL-SCNC: 4.8 MMOL/L
PTH-INTACT SERPL-MCNC: 67 PG/ML
SODIUM SERPL-SCNC: 140 MMOL/L

## 2017-06-01 PROCEDURE — 83036 HEMOGLOBIN GLYCOSYLATED A1C: CPT

## 2017-06-01 PROCEDURE — 80069 RENAL FUNCTION PANEL: CPT

## 2017-06-01 PROCEDURE — 36415 COLL VENOUS BLD VENIPUNCTURE: CPT | Mod: PO

## 2017-06-01 PROCEDURE — 82306 VITAMIN D 25 HYDROXY: CPT

## 2017-06-01 PROCEDURE — 83970 ASSAY OF PARATHORMONE: CPT

## 2017-06-01 PROCEDURE — 85014 HEMATOCRIT: CPT

## 2017-06-02 LAB
ESTIMATED AVG GLUCOSE: 157 MG/DL
HBA1C MFR BLD HPLC: 7.1 %

## 2017-07-18 DIAGNOSIS — M1A.9XX0 CHRONIC GOUT WITHOUT TOPHUS, UNSPECIFIED CAUSE, UNSPECIFIED SITE: Chronic | ICD-10-CM

## 2017-07-18 RX ORDER — FUROSEMIDE 20 MG/1
TABLET ORAL
Qty: 90 TABLET | Refills: 1 | Status: SHIPPED | OUTPATIENT
Start: 2017-07-18 | End: 2018-01-14 | Stop reason: SDUPTHER

## 2017-08-18 DIAGNOSIS — J44.9 CHRONIC OBSTRUCTIVE PULMONARY DISEASE, UNSPECIFIED COPD TYPE: ICD-10-CM

## 2017-08-18 RX ORDER — TIOTROPIUM BROMIDE 18 UG/1
CAPSULE ORAL; RESPIRATORY (INHALATION)
Qty: 90 CAPSULE | Refills: 2 | Status: SHIPPED | OUTPATIENT
Start: 2017-08-18 | End: 2018-05-15 | Stop reason: SDUPTHER

## 2017-08-27 RX ORDER — PRAVASTATIN SODIUM 40 MG/1
TABLET ORAL
Qty: 90 TABLET | Refills: 2 | Status: SHIPPED | OUTPATIENT
Start: 2017-08-27 | End: 2019-01-08 | Stop reason: SDUPTHER

## 2017-11-16 RX ORDER — GLIPIZIDE 5 MG/1
TABLET, FILM COATED, EXTENDED RELEASE ORAL
Qty: 90 TABLET | Refills: 3 | Status: SHIPPED | OUTPATIENT
Start: 2017-11-16 | End: 2019-01-08 | Stop reason: SDUPTHER

## 2017-12-02 RX ORDER — ALBUTEROL SULFATE 90 UG/1
AEROSOL, METERED RESPIRATORY (INHALATION)
Qty: 25.5 G | Refills: 3 | Status: SHIPPED | OUTPATIENT
Start: 2017-12-02 | End: 2017-12-05 | Stop reason: SDUPTHER

## 2017-12-05 RX ORDER — ALBUTEROL SULFATE 90 UG/1
AEROSOL, METERED RESPIRATORY (INHALATION)
Qty: 25.5 G | Refills: 3 | Status: SHIPPED | OUTPATIENT
Start: 2017-12-05 | End: 2018-09-28 | Stop reason: SDUPTHER

## 2018-01-14 DIAGNOSIS — M1A.9XX0 CHRONIC GOUT WITHOUT TOPHUS, UNSPECIFIED CAUSE, UNSPECIFIED SITE: Chronic | ICD-10-CM

## 2018-01-14 RX ORDER — FUROSEMIDE 20 MG/1
TABLET ORAL
Qty: 90 TABLET | Refills: 1 | Status: SHIPPED | OUTPATIENT
Start: 2018-01-14 | End: 2018-07-13 | Stop reason: SDUPTHER

## 2018-01-24 RX ORDER — ALLOPURINOL 100 MG/1
TABLET ORAL
Qty: 90 TABLET | Refills: 3 | Status: SHIPPED | OUTPATIENT
Start: 2018-01-24 | End: 2019-01-08 | Stop reason: SDUPTHER

## 2018-03-12 ENCOUNTER — CLINICAL SUPPORT (OUTPATIENT)
Dept: AUDIOLOGY | Facility: CLINIC | Age: 83
End: 2018-03-12
Payer: MEDICARE

## 2018-03-13 NOTE — PROGRESS NOTES
The patient brought in her left hearing aid and reported it was not working.  This aid was cleaned and checked.  A listening check of the left aid revealed essentially no amplification.  The aid was sent to [employer] for an in-warranty repair.  The warranty coverage was extended until 4/2/18, per phone conversation with [employer] today.  The patient would like to send her right aid for an in-warranty clean and check after the left aid returns from repair.  Contact patient when left aid returns from repair.  The patient was informed that she is due for an annual hearing evaluation and that her hearing aids have not been reprogrammed since January 2017 when the patient was treated by our Nurse Practitioner for Type B tympanograms. The patient reported that she will schedule an annual audiological evaluation after her aids return from repair.

## 2018-04-26 ENCOUNTER — CLINICAL SUPPORT (OUTPATIENT)
Dept: AUDIOLOGY | Facility: CLINIC | Age: 83
End: 2018-04-26
Payer: MEDICARE

## 2018-04-26 ENCOUNTER — OFFICE VISIT (OUTPATIENT)
Dept: OTOLARYNGOLOGY | Facility: CLINIC | Age: 83
End: 2018-04-26
Payer: MEDICARE

## 2018-04-26 VITALS
DIASTOLIC BLOOD PRESSURE: 64 MMHG | WEIGHT: 186.31 LBS | HEART RATE: 104 BPM | SYSTOLIC BLOOD PRESSURE: 123 MMHG | BODY MASS INDEX: 35.18 KG/M2 | HEIGHT: 61 IN

## 2018-04-26 DIAGNOSIS — H61.23 BILATERAL IMPACTED CERUMEN: Primary | ICD-10-CM

## 2018-04-26 DIAGNOSIS — H90.3 BILATERAL SENSORINEURAL HEARING LOSS: Primary | ICD-10-CM

## 2018-04-26 DIAGNOSIS — Z97.4 WEARS HEARING AID IN BOTH EARS: ICD-10-CM

## 2018-04-26 DIAGNOSIS — H93.293 IMPAIRED AUDITORY DISCRIMINATION, BILATERAL: ICD-10-CM

## 2018-04-26 DIAGNOSIS — Z97.4 WEARS HEARING AID: ICD-10-CM

## 2018-04-26 DIAGNOSIS — H91.90 HEARING DIFFICULTY, UNSPECIFIED LATERALITY: Primary | ICD-10-CM

## 2018-04-26 DIAGNOSIS — H90.3 SENSORINEURAL HEARING LOSS (SNHL) OF BOTH EARS: ICD-10-CM

## 2018-04-26 PROCEDURE — 99211 OFF/OP EST MAY X REQ PHY/QHP: CPT | Mod: PBBFAC,PO,25

## 2018-04-26 PROCEDURE — 99999 PR PBB SHADOW E&M-EST. PATIENT-LVL I: CPT | Mod: PBBFAC,,,

## 2018-04-26 PROCEDURE — 99213 OFFICE O/P EST LOW 20 MIN: CPT | Mod: 25,S$PBB,, | Performed by: NURSE PRACTITIONER

## 2018-04-26 PROCEDURE — 99999 PR PBB SHADOW E&M-EST. PATIENT-LVL IV: CPT | Mod: PBBFAC,,, | Performed by: NURSE PRACTITIONER

## 2018-04-26 PROCEDURE — 92567 TYMPANOMETRY: CPT | Mod: PBBFAC,PO | Performed by: AUDIOLOGIST

## 2018-04-26 PROCEDURE — 92557 COMPREHENSIVE HEARING TEST: CPT | Mod: PBBFAC,PO | Performed by: AUDIOLOGIST

## 2018-04-26 PROCEDURE — 99214 OFFICE O/P EST MOD 30 MIN: CPT | Mod: PBBFAC,27,PO,25 | Performed by: NURSE PRACTITIONER

## 2018-04-26 PROCEDURE — G0268 REMOVAL OF IMPACTED WAX MD: HCPCS | Mod: PBBFAC,PO | Performed by: NURSE PRACTITIONER

## 2018-04-26 PROCEDURE — G0268 REMOVAL OF IMPACTED WAX MD: HCPCS | Mod: S$PBB,,, | Performed by: NURSE PRACTITIONER

## 2018-04-26 RX ORDER — METOLAZONE 2.5 MG/1
TABLET ORAL
COMMUNITY
Start: 2018-03-03 | End: 2019-01-08 | Stop reason: SDUPTHER

## 2018-04-26 NOTE — PROGRESS NOTES
The patient was seen for a hearing aid follow-up appointment after having a hearing evaluation performed today.  Hearing thresholds from today's evaluation were entered into the hearing aid software, and the programming for the left and right hearing aid was recalculated using the current hearing thresholds. The patient reported she gets feedback when using the phone with either ear.  Feedback Manager was run and applied today in the right and left aid.  Also, a phone pad will be ordered for the patient to try with her home phone. The right and left hearing aid was cleaned and checked.  A listening check revealed each aid to sound good.  Wax filters were given to the patient.  An annual audiological evaluation was recommended.  The patient will contact us as needed.

## 2018-04-26 NOTE — PROGRESS NOTES
Bel Oquendo was seen 04/26/2018 for an audiological evaluation.     Patient has an established bilateral SNHL and wears binaural amplification ([employer] ITC's).     Results revealed a mild-to-severe sensorineural hearing loss for the right ear and a mild-to-severe sensorineural hearing loss for the left ear.   Speech Reception Thresholds were  55 dBHL for the right ear and 55 dBHL for the left ear.    Word recognition scores were fair for the right ear and fair for the left ear.   Tympanograms were Type A for the right ear and Type A for the left ear.    Audiogram results were reviewed in detail with patient and all questions were answered. Results will be reviewed by ENT at the completion of this note.     Recommend continued daily use of binaural amplification with appropriate changes made to patient's hearing aid settings based on current audiogram thresholds and annual audiograms to monitor hearing loss.

## 2018-04-26 NOTE — PROGRESS NOTES
Subjective:       Patient ID: Bel Oquendo is a 85 y.o. female.    Chief Complaint: Cerumen Impaction and Hearing Loss (annual audio)    HPI   Patient returns for annual audiogram but needs ear cleaning first. She would also like hearing aids cleaned as they are full of wax. She denies otalgia, otorrhea, or other ENT symptoms or concerns at this time.     Review of Systems   Constitutional: Negative.    HENT: Negative.    Eyes: Negative.    Respiratory: Negative.    Cardiovascular: Negative.    Gastrointestinal: Negative.    Musculoskeletal: Negative.    Skin: Negative.    Neurological: Negative.    Hematological: Negative.    Psychiatric/Behavioral: Negative.        Objective:      Physical Exam   Constitutional: She is oriented to person, place, and time. Vital signs are normal. She appears well-developed and well-nourished. She is cooperative. She does not appear ill. No distress.   HENT:   Head: Normocephalic and atraumatic.   Right Ear: Hearing, tympanic membrane, external ear and ear canal normal. Tympanic membrane is not erythematous. No middle ear effusion.   Left Ear: Hearing, tympanic membrane, external ear and ear canal normal. Tympanic membrane is not erythematous.  No middle ear effusion.   Nose: Nose normal. No mucosal edema or rhinorrhea.   Mouth/Throat: Uvula is midline, oropharynx is clear and moist and mucous membranes are normal.     SEPARATE PROCEDURE IN OFFICE:   Procedure: Removal of impacted cerumen, bilateral   Pre Procedure Diagnosis: Cerumen Impaction   Post Procedure Diagnosis: Cerumen Impaction   Verbal informed consent in regards to risk of trauma to ear canal, ear drum or hearing, discomfort during procedure and/or inability to remove cerumen impaction in one session or unforeseen events or complications.   No anesthesia.     Procedure in detail:   Ear canal visualized bilateral with appropriate size ear speculum utilizing Operating Head Binocular Otomicroscope   Utilizing the following:  Ring curet, delicate alligator forceps AU. The impacted cerumen of the ear canals was removed atraumatically. The TM and EAC were then inspected and found to be clear of wax. See description of TMs/EACs in PE above.   Complications: No   Condition: Improved/Good     Eyes: EOM and lids are normal. Right eye exhibits no discharge. Left eye exhibits no discharge. No scleral icterus.   Neck: Trachea normal and normal range of motion. Neck supple. No tracheal deviation present.   Cardiovascular: Normal rate.    Pulmonary/Chest: Effort normal. No stridor. No respiratory distress. She has no wheezes.   Musculoskeletal: Normal range of motion.   Neurological: She is alert and oriented to person, place, and time. She has normal strength. Coordination and gait normal.   Skin: Skin is warm, dry and intact. No lesion and no rash noted. She is not diaphoretic. No cyanosis. No pallor.   Psychiatric: She has a normal mood and affect. Her speech is normal and behavior is normal. Judgment and thought content normal. Cognition and memory are normal.   Nursing note and vitals reviewed.      Assessment:     Bilateral cerumen impactions removed    Bilateral SNHL, wears hearing aids  Plan:     Recommend continued use of binaural amplification and annual audiograms    Return to clinic as needed for further ENT concerns

## 2018-05-15 DIAGNOSIS — J44.9 CHRONIC OBSTRUCTIVE PULMONARY DISEASE, UNSPECIFIED COPD TYPE: ICD-10-CM

## 2018-05-15 RX ORDER — TIOTROPIUM BROMIDE 18 UG/1
CAPSULE ORAL; RESPIRATORY (INHALATION)
Qty: 90 CAPSULE | Refills: 2 | Status: SHIPPED | OUTPATIENT
Start: 2018-05-15 | End: 2019-02-09 | Stop reason: SDUPTHER

## 2018-07-13 DIAGNOSIS — I10 ESSENTIAL HYPERTENSION: Primary | ICD-10-CM

## 2018-07-13 DIAGNOSIS — M1A.9XX0 CHRONIC GOUT WITHOUT TOPHUS, UNSPECIFIED CAUSE, UNSPECIFIED SITE: Chronic | ICD-10-CM

## 2018-07-13 RX ORDER — FUROSEMIDE 20 MG/1
TABLET ORAL
Qty: 90 TABLET | Refills: 1 | Status: SHIPPED | OUTPATIENT
Start: 2018-07-13 | End: 2019-03-17 | Stop reason: SDUPTHER

## 2018-07-17 ENCOUNTER — LAB VISIT (OUTPATIENT)
Dept: LAB | Facility: HOSPITAL | Age: 83
End: 2018-07-17
Attending: EMERGENCY MEDICINE
Payer: MEDICARE

## 2018-07-17 DIAGNOSIS — M1A.9XX0 CHRONIC GOUT WITHOUT TOPHUS, UNSPECIFIED CAUSE, UNSPECIFIED SITE: Chronic | ICD-10-CM

## 2018-07-17 DIAGNOSIS — I10 ESSENTIAL HYPERTENSION: ICD-10-CM

## 2018-07-17 LAB
ALBUMIN SERPL BCP-MCNC: 3.2 G/DL
ALP SERPL-CCNC: 69 U/L
ALT SERPL W/O P-5'-P-CCNC: 10 U/L
ANION GAP SERPL CALC-SCNC: 6 MMOL/L
AST SERPL-CCNC: 11 U/L
BASOPHILS # BLD AUTO: 0.05 K/UL
BASOPHILS NFR BLD: 0.4 %
BILIRUB SERPL-MCNC: 0.3 MG/DL
BUN SERPL-MCNC: 28 MG/DL
CALCIUM SERPL-MCNC: 9.7 MG/DL
CHLORIDE SERPL-SCNC: 94 MMOL/L
CHOLEST SERPL-MCNC: 178 MG/DL
CHOLEST/HDLC SERPL: 4.9 {RATIO}
CO2 SERPL-SCNC: 36 MMOL/L
CREAT SERPL-MCNC: 1.5 MG/DL
DIFFERENTIAL METHOD: ABNORMAL
EOSINOPHIL # BLD AUTO: 0.5 K/UL
EOSINOPHIL NFR BLD: 4.2 %
ERYTHROCYTE [DISTWIDTH] IN BLOOD BY AUTOMATED COUNT: 14.6 %
EST. GFR  (AFRICAN AMERICAN): 36.4 ML/MIN/1.73 M^2
EST. GFR  (NON AFRICAN AMERICAN): 31.5 ML/MIN/1.73 M^2
GLUCOSE SERPL-MCNC: 168 MG/DL
HCT VFR BLD AUTO: 39.7 %
HDLC SERPL-MCNC: 36 MG/DL
HDLC SERPL: 20.2 %
HGB BLD-MCNC: 12.3 G/DL
IMM GRANULOCYTES # BLD AUTO: 0.18 K/UL
IMM GRANULOCYTES NFR BLD AUTO: 1.6 %
LDLC SERPL CALC-MCNC: 91.4 MG/DL
LYMPHOCYTES # BLD AUTO: 1.2 K/UL
LYMPHOCYTES NFR BLD: 10.1 %
MCH RBC QN AUTO: 29.8 PG
MCHC RBC AUTO-ENTMCNC: 31 G/DL
MCV RBC AUTO: 96 FL
MONOCYTES # BLD AUTO: 1 K/UL
MONOCYTES NFR BLD: 9.1 %
NEUTROPHILS # BLD AUTO: 8.5 K/UL
NEUTROPHILS NFR BLD: 74.6 %
NONHDLC SERPL-MCNC: 142 MG/DL
NRBC BLD-RTO: 0 /100 WBC
PLATELET # BLD AUTO: 176 K/UL
PMV BLD AUTO: 13.2 FL
POTASSIUM SERPL-SCNC: 4.6 MMOL/L
PROT SERPL-MCNC: 6.5 G/DL
RBC # BLD AUTO: 4.13 M/UL
SODIUM SERPL-SCNC: 136 MMOL/L
TRIGL SERPL-MCNC: 253 MG/DL
URATE SERPL-MCNC: 7.3 MG/DL
WBC # BLD AUTO: 11.42 K/UL

## 2018-07-17 PROCEDURE — 85025 COMPLETE CBC W/AUTO DIFF WBC: CPT

## 2018-07-17 PROCEDURE — 36415 COLL VENOUS BLD VENIPUNCTURE: CPT | Mod: PO

## 2018-07-17 PROCEDURE — 80061 LIPID PANEL: CPT

## 2018-07-17 PROCEDURE — 84550 ASSAY OF BLOOD/URIC ACID: CPT

## 2018-07-17 PROCEDURE — 80053 COMPREHEN METABOLIC PANEL: CPT

## 2018-09-04 DIAGNOSIS — K21.9 GASTROESOPHAGEAL REFLUX DISEASE, ESOPHAGITIS PRESENCE NOT SPECIFIED: ICD-10-CM

## 2018-09-04 RX ORDER — PANTOPRAZOLE SODIUM 40 MG/1
40 TABLET, DELAYED RELEASE ORAL DAILY
Qty: 90 TABLET | Refills: 0 | Status: SHIPPED | OUTPATIENT
Start: 2018-09-04 | End: 2019-01-08 | Stop reason: SDUPTHER

## 2018-09-28 RX ORDER — ALBUTEROL SULFATE 90 UG/1
AEROSOL, METERED RESPIRATORY (INHALATION)
Qty: 25.5 G | Refills: 3 | Status: SHIPPED | OUTPATIENT
Start: 2018-09-28

## 2019-01-07 ENCOUNTER — TELEPHONE (OUTPATIENT)
Dept: FAMILY MEDICINE | Facility: CLINIC | Age: 84
End: 2019-01-07

## 2019-01-07 NOTE — TELEPHONE ENCOUNTER
----- Message from Melina Miles sent at 1/7/2019  3:01 PM CST -----  Contact: Daughter, Janet Gonzales want to speak with a nurse regarding charging directions on metOLazone please call back at [phone]

## 2019-01-07 NOTE — TELEPHONE ENCOUNTER
----- Message from Elizabeth Burger sent at 1/7/2019  2:18 PM CST -----  Contact: self  Patient [phone] is requesting an appt with Dr Ma to renew her medications/she is in need of a physical and only wants to see Dr Ma/please advise as I do not show any appts until March 2019

## 2019-01-07 NOTE — TELEPHONE ENCOUNTER
I spoke with pt daughter. For three weeks she has been short of breath on short walks in the home. She has swelling of feet also. She is almost out of her meds.   I made her an appointment for tomorrow.   Daughter said last time she had this, she was told to take metolzone twice a week for a month along with her furosemide.   Pt will discuss this with you at visit. Daughter lives in [facility] and will not be with pt.

## 2019-01-08 ENCOUNTER — OFFICE VISIT (OUTPATIENT)
Dept: FAMILY MEDICINE | Facility: CLINIC | Age: 84
End: 2019-01-08
Payer: MEDICARE

## 2019-01-08 ENCOUNTER — PATIENT OUTREACH (OUTPATIENT)
Dept: ADMINISTRATIVE | Facility: HOSPITAL | Age: 84
End: 2019-01-08

## 2019-01-08 ENCOUNTER — LAB VISIT (OUTPATIENT)
Dept: LAB | Facility: HOSPITAL | Age: 84
End: 2019-01-08
Attending: EMERGENCY MEDICINE
Payer: MEDICARE

## 2019-01-08 VITALS
SYSTOLIC BLOOD PRESSURE: 112 MMHG | DIASTOLIC BLOOD PRESSURE: 60 MMHG | WEIGHT: 189.38 LBS | BODY MASS INDEX: 35.75 KG/M2 | OXYGEN SATURATION: 94 % | HEART RATE: 95 BPM | HEIGHT: 61 IN | RESPIRATION RATE: 16 BRPM

## 2019-01-08 DIAGNOSIS — K21.9 GASTROESOPHAGEAL REFLUX DISEASE, ESOPHAGITIS PRESENCE NOT SPECIFIED: ICD-10-CM

## 2019-01-08 DIAGNOSIS — N18.30 CKD (CHRONIC KIDNEY DISEASE) STAGE 3, GFR 30-59 ML/MIN: Chronic | ICD-10-CM

## 2019-01-08 DIAGNOSIS — M1A.9XX0 CHRONIC GOUT WITHOUT TOPHUS, UNSPECIFIED CAUSE, UNSPECIFIED SITE: Chronic | ICD-10-CM

## 2019-01-08 DIAGNOSIS — J44.9 CHRONIC OBSTRUCTIVE PULMONARY DISEASE, UNSPECIFIED COPD TYPE: Primary | Chronic | ICD-10-CM

## 2019-01-08 DIAGNOSIS — E11.21 DIABETES MELLITUS WITH NEPHROPATHY: Chronic | ICD-10-CM

## 2019-01-08 DIAGNOSIS — I87.2 VENOUS INSUFFICIENCY (CHRONIC) (PERIPHERAL): ICD-10-CM

## 2019-01-08 DIAGNOSIS — I10 ESSENTIAL HYPERTENSION: ICD-10-CM

## 2019-01-08 DIAGNOSIS — H91.93 BILATERAL HEARING LOSS, UNSPECIFIED HEARING LOSS TYPE: Chronic | ICD-10-CM

## 2019-01-08 LAB
CHOLEST SERPL-MCNC: 185 MG/DL
CHOLEST/HDLC SERPL: 4.9 {RATIO}
ESTIMATED AVG GLUCOSE: 146 MG/DL
HBA1C MFR BLD HPLC: 6.7 %
HDLC SERPL-MCNC: 38 MG/DL
HDLC SERPL: 20.5 %
LDLC SERPL CALC-MCNC: ABNORMAL MG/DL
NONHDLC SERPL-MCNC: 147 MG/DL
PTH-INTACT SERPL-MCNC: 103 PG/ML
TRIGL SERPL-MCNC: 403 MG/DL

## 2019-01-08 PROCEDURE — 99999 PR PBB SHADOW E&M-EST. PATIENT-LVL V: CPT | Mod: PBBFAC,,, | Performed by: EMERGENCY MEDICINE

## 2019-01-08 PROCEDURE — 80053 COMPREHEN METABOLIC PANEL: CPT

## 2019-01-08 PROCEDURE — 80061 LIPID PANEL: CPT

## 2019-01-08 PROCEDURE — 99215 OFFICE O/P EST HI 40 MIN: CPT | Mod: PBBFAC,PO | Performed by: EMERGENCY MEDICINE

## 2019-01-08 PROCEDURE — 99214 OFFICE O/P EST MOD 30 MIN: CPT | Mod: S$PBB,,, | Performed by: EMERGENCY MEDICINE

## 2019-01-08 PROCEDURE — 99999 PR PBB SHADOW E&M-EST. PATIENT-LVL V: ICD-10-PCS | Mod: PBBFAC,,, | Performed by: EMERGENCY MEDICINE

## 2019-01-08 PROCEDURE — 83970 ASSAY OF PARATHORMONE: CPT

## 2019-01-08 PROCEDURE — 99214 PR OFFICE/OUTPT VISIT, EST, LEVL IV, 30-39 MIN: ICD-10-PCS | Mod: S$PBB,,, | Performed by: EMERGENCY MEDICINE

## 2019-01-08 PROCEDURE — 83036 HEMOGLOBIN GLYCOSYLATED A1C: CPT

## 2019-01-08 PROCEDURE — 36415 COLL VENOUS BLD VENIPUNCTURE: CPT | Mod: PO

## 2019-01-08 RX ORDER — FUROSEMIDE 20 MG/1
20 TABLET ORAL DAILY
Qty: 90 TABLET | Refills: 1 | Status: SHIPPED | OUTPATIENT
Start: 2019-01-08 | End: 2020-01-13

## 2019-01-08 RX ORDER — PRAVASTATIN SODIUM 40 MG/1
40 TABLET ORAL DAILY
Qty: 90 TABLET | Refills: 3 | Status: SHIPPED | OUTPATIENT
Start: 2019-01-08 | End: 2020-01-14

## 2019-01-08 RX ORDER — ALLOPURINOL 100 MG/1
100 TABLET ORAL DAILY
Qty: 90 TABLET | Refills: 3 | Status: SHIPPED | OUTPATIENT
Start: 2019-01-08 | End: 2019-09-12 | Stop reason: SDUPTHER

## 2019-01-08 RX ORDER — GLIPIZIDE 5 MG/1
5 TABLET, FILM COATED, EXTENDED RELEASE ORAL
Qty: 90 TABLET | Refills: 3 | Status: SHIPPED | OUTPATIENT
Start: 2019-01-08 | End: 2020-02-12

## 2019-01-08 RX ORDER — METOLAZONE 2.5 MG/1
2.5 TABLET ORAL DAILY
Qty: 30 TABLET | Refills: 2 | Status: SHIPPED | OUTPATIENT
Start: 2019-01-08 | End: 2019-03-04 | Stop reason: SDUPTHER

## 2019-01-08 RX ORDER — PANTOPRAZOLE SODIUM 40 MG/1
40 TABLET, DELAYED RELEASE ORAL DAILY
Qty: 90 TABLET | Refills: 0 | Status: SHIPPED | OUTPATIENT
Start: 2019-01-08 | End: 2019-04-20 | Stop reason: SDUPTHER

## 2019-01-08 NOTE — LETTER
January 16, 2019    Bel LUIZ Jere  [address]  [place] [address]             [facility]  [address]  [facility] [address]  Phone: [phone] Dear Mrs. Oquendo:    We have tried to reach you by mychart unsuccessfully.    [facility] is committed to your overall health.  To help you get the most out of each of your visits, we will review your information to make sure you are up to date on all of your recommended tests and or procedures.       Dr. Gurjit Ma Jr, MD has found that you may be due for:     Tetanus vaccine   Annual diabetic eye and foot exam   Hemoglobin A1C     If you have had any of the above done at another facility, please bring the records or information with you so that your record at [facility] will be complete and up to date.     If you have not had any of these tests or procedures done recently and would like to complete this testing before your appointment on 1/22/19 at 2:00 pm with Gurjit Ma Jr, MD please call [phone] or send a message through your [facility] portal to your provider's office.     If you are currently taking medication, please bring it with you to your appointment for review.     Also, if you have any type of Advanced Directives, please bring them with you to your office visit so we may scan them into your chart.     Please feel free to call the number listed below if you have any questions.     Thanks,     Lencho Pnea LPN Clinical Care Coordinator   [facility] [address] [facility] [address]   [place] [id number]   [phone] (p)   [phone] (f)

## 2019-01-09 LAB
ALBUMIN SERPL BCP-MCNC: 3.2 G/DL
ALP SERPL-CCNC: 67 U/L
ALT SERPL W/O P-5'-P-CCNC: 9 U/L
ANION GAP SERPL CALC-SCNC: 7 MMOL/L
AST SERPL-CCNC: 15 U/L
BILIRUB SERPL-MCNC: 0.2 MG/DL
BUN SERPL-MCNC: 18 MG/DL
CALCIUM SERPL-MCNC: 9.8 MG/DL
CHLORIDE SERPL-SCNC: 92 MMOL/L
CO2 SERPL-SCNC: 39 MMOL/L
CREAT SERPL-MCNC: 1.2 MG/DL
EST. GFR  (AFRICAN AMERICAN): 47.3 ML/MIN/1.73 M^2
EST. GFR  (NON AFRICAN AMERICAN): 41 ML/MIN/1.73 M^2
GLUCOSE SERPL-MCNC: 155 MG/DL
POTASSIUM SERPL-SCNC: 4.5 MMOL/L
PROT SERPL-MCNC: 6.8 G/DL
SODIUM SERPL-SCNC: 138 MMOL/L

## 2019-01-13 NOTE — PROGRESS NOTES
Subjective:   THIS NOTE IS DONE WITH VOICE RECOGNITION        Patient ID: Bel Cortez is a 86 y.o. female.    Chief Complaint: COPD      HPI   Bel continues to do remarkably well.  She has seen my colleagues in the last 2 years, but I have not seen her.  My initial impression is that she has an age today.    She presents today to follow-up on her chronic obstructive lung disease, diabetes, and chronic kidney disease.  She also has chronic venous insufficiency, gout, hyperlipidemia.    Here to follow up on blood pressure.  Taking current medications.    BP Readings from Last 3 Encounters:   01/08/19 112/60   04/26/18 123/64   01/27/17 (!) 0/0     She does not report any angina or anginal symptoms.  She does not report any significant claudication.  Her activity levels are significantly reduced.    She is using oxygen with her chronic lung disease. She continues to use Spiriva.  She has not had any recent lung infections.  Her immunizations are current.    Her diabetes control has been good, based on our laboratory.  She appropriately is not checking her sugars on a regular basis.  I have encouraged her to do this if she gets sick.  She is aware of the need for adequate hydration when she does get sick.    Results for BEL CORTEZ (MRN [id number]) as of 1/13/2019 10:05   Ref. Range 12/30/2015 14:59 8/24/2016 10:12 12/16/2016 14:28 6/1/2017 13:14 1/8/2019 16:12   Hemoglobin A1C External Latest Ref Range: 4.0 - 5.6 % 6.0 7.5 (H)  7.1 (H) 6.7 (H)   Estimated Avg Glucose Latest Ref Range: 68 - 131 mg/dL 126 169 (H)  157 (H) 146 (H)     Hyperlipidemia monitoring.    Lab Results   Component Value Date    CHOL 185 01/08/2019    CHOL 178 07/17/2018    CHOL 184 02/26/2016     Lab Results   Component Value Date    HDL 38 (L) 01/08/2019    HDL 36 (L) 07/17/2018    HDL 35 (L) 02/26/2016     Lab Results   Component Value Date    LDLCALC Invalid, Trig>400.0 01/08/2019    LDLCALC 91.4 07/17/2018    LDLCALC 108.8 02/26/2016     Lab  Results   Component Value Date    TRIG 403 (H) 01/08/2019    TRIG 253 (H) 07/17/2018    TRIG 201 (H) 02/26/2016     Lab Results   Component Value Date    CHOLHDL 20.5 01/08/2019    CHOLHDL 20.2 07/17/2018    CHOLHDL 19.0 (L) 02/26/2016     She does have chronic kidney disease. We will update her labs as well as her PTH today.    Results for PETER CORTEZ (MRN [id number]) as of 1/13/2019 10:05   Ref. Range 12/30/2015 14:59 8/24/2016 10:12 12/16/2016 14:28 6/1/2017 13:14 1/8/2019 16:12   PTH Latest Ref Range: 9.0 - 77.0 pg/mL   91.0 (H) 67.0 103.0 (H)       Results for PETER CORTEZ (MRN [id number]) as of 1/13/2019 10:05   Ref. Range 12/16/2016 14:28 12/29/2016 07:51 6/1/2017 13:14 7/17/2018 10:07 1/8/2019 16:12   Calcium Latest Ref Range: 8.7 - 10.5 mg/dL 9.8 8.5 9.6 9.7 9.8       Results for PETER CORTEZ (MRN [id number]) as of 1/13/2019 10:05   Ref. Range 12/16/2016 14:28 12/29/2016 07:51 6/1/2017 13:14 7/17/2018 10:07 1/8/2019 16:12   Creatinine Latest Ref Range: 0.5 - 1.4 mg/dL 1.3 1.19 1.4 1.5 (H) 1.2   eGFR if non African American Latest Ref Range: >60 mL/min/1.73 m^2 37.8 (A) 42 (A) 34.5 (A) 31.5 (A) 41.0 (A)     She does have gout.  She is managing this with allopurinol.  She has not had a recent flare.  She is due for update of labs including CBC.    Her quality of life remains excellent.  She has a number of friends and family that she interacts with.  She is able to make all of her own decisions.    Immunization History   Administered Date(s) Administered    Influenza - High Dose 09/24/2012, 10/21/2013, 11/06/2015, 10/06/2016, 10/01/2017, 09/28/2018    Pneumococcal Conjugate - 13 Valent 11/06/2015    Pneumococcal Polysaccharide - 23 Valent 07/21/2014    Zoster 06/28/2012       Current Outpatient Medications   Medication Sig Dispense Refill    albuterol (PROAIR HFA) 90 mcg/actuation inhaler USE 1 TO 2 INHALATIONS EVERY 6 HOURS AS NEEDED FOR WHEEZING OR SHORTNESS OF BREATH 25.5 g 3    allopurinol (ZYLOPRIM) 100 MG  tablet Take 1 tablet (100 mg total) by mouth once daily. 90 tablet 3    aspirin (ECOTRIN) 81 MG EC tablet Every day      azelastine (ASTELIN) 137 mcg (0.1 %) nasal spray 1 spray (137 mcg total) by Nasal route 2 (two) times daily. 30 mL 12    BD ULTRA-FINE II LANCETS 30 gauge Misc USE ONCE DAILY 100 each 3    CONTOUR NEXT STRIPS Strp USE ONE STRIP DAILY TO TEST BLOOD SUGAR 100 strip 2    fish oil-omega-3 fatty acids 300-1,000 mg capsule Take 2 g by mouth once daily.        fluticasone-salmeterol 100-50 mcg/dose (ADVAIR) 100-50 mcg/dose diskus inhaler Inhale 1 puff into the lungs 2 (two) times daily. 180 each 3    furosemide (LASIX) 20 MG tablet Take 1 tablet (20 mg total) by mouth once daily. 90 tablet 1    glipiZIDE (GLUCOTROL) 5 MG TR24 Take 1 tablet (5 mg total) by mouth daily with breakfast. 90 tablet 3    GLUCOSAM HCL/MSM/CHONDRO SCRUGGS A (GLUCOSAMINE HCL-MSM-CHONDROITN) 400-200-333 mg Tab Every day      lisinopril (PRINIVIL,ZESTRIL) 2.5 MG tablet Take 2.5 mg by mouth once daily.      metOLazone (ZAROXOLYN) 2.5 MG tablet Take 1 tablet (2.5 mg total) by mouth once daily. 30 tablet 2    MILK THISTLE ORAL Take 1 tablet by mouth once daily.       multivitamin (THERAGRAN) per tablet Take 1 tablet by mouth once daily.      mv-mn/iron/folic acid/herb 190 (VITAMIN D3 COMPLETE ORAL) Take by mouth.      OXYGEN-AIR DELIVERY SYSTEMS MISC [place] [facility] [place]Non[place]Drug; Combo Route) route continuous.      pantoprazole (PROTONIX) 40 MG tablet Take 1 tablet (40 mg total) by mouth once daily. 90 tablet 0    pravastatin (PRAVACHOL) 40 MG tablet Take 1 tablet (40 mg total) by mouth once daily. 90 tablet 3    SPIRIVA WITH HANDIHALER 18 mcg inhalation capsule INHALE THE CONTENTS OF 1 CAPSULE DAILY 90 capsule 2     No current facility-administered medications for this visit.          Review of Systems   Constitutional: Positive for fatigue. Negative for activity change, appetite change, chills, diaphoresis, fever and unexpected  weight change.   HENT: Negative for congestion, ear pain, hearing loss, rhinorrhea, trouble swallowing and voice change.    Eyes: Negative for pain and visual disturbance.   Respiratory: Positive for cough (controlled.) and shortness of breath. Negative for chest tightness.         She is using full-time oxygen at 2 liters.   Cardiovascular: Negative for chest pain, palpitations and leg swelling.   Gastrointestinal: Negative for abdominal distention, abdominal pain and blood in stool.   Genitourinary: Negative for difficulty urinating, flank pain, frequency and urgency.   Musculoskeletal: Positive for arthralgias and back pain. Negative for joint swelling, myalgias, neck pain and neck stiffness.   Skin: Negative.  Negative for pallor and rash.   Neurological: Negative for dizziness, tremors, syncope, weakness and headaches.   Hematological: Negative for adenopathy. Bruises/bleeds easily.   Psychiatric/Behavioral: Negative.  Negative for dysphoric mood and sleep disturbance. The patient is not nervous/anxious.        Objective:      Physical Exam   Constitutional: She is oriented to person, place, and time. She appears well-developed and well-nourished. No distress.   HENT:   Head: Normocephalic and atraumatic.   Nose: Nose normal.   Mouth/Throat: Oropharynx is clear and moist.   Eyes: Conjunctivae and EOM are normal. Pupils are equal, round, and reactive to light. No scleral icterus.   Neck: Normal range of motion. Neck supple. No JVD present. No thyromegaly present.   Cardiovascular: Normal rate, regular rhythm, normal heart sounds and intact distal pulses.   No murmur heard.  She does have changes consistent with chronic venous insufficiency, bilaterally. No ulcerations.   Pulmonary/Chest: Effort normal and breath sounds normal. She has no wheezes. She has no rales.   Shallow breath sounds. No rales or rhonchi.  Using full-time oxygen   Abdominal: Soft. Bowel sounds are normal. She exhibits no distension. There is  no tenderness.   Musculoskeletal: Normal range of motion. She exhibits no edema or tenderness.   Lymphadenopathy:     She has no cervical adenopathy.   Neurological: She is alert and oriented to person, place, and time. She has normal reflexes. No cranial nerve deficit. Coordination normal.   Skin: Skin is warm and dry. No rash noted. No erythema.   Psychiatric: She has a normal mood and affect. Her behavior is normal.   Her interactions and her attitude or delightful.  She is very engage.   Vitals reviewed.      Assessment:       1. Chronic obstructive pulmonary disease, unspecified COPD type    2. Gastroesophageal reflux disease, esophagitis presence not specified    3. Essential hypertension    4. Diabetes mellitus with nephropathy    5. CKD (chronic kidney disease) stage 3, GFR 30-59 ml/min    6. Chronic gout without tophus, unspecified cause, unspecified site    7. Venous insufficiency (chronic) (peripheral)    8. Bilateral hearing loss, unspecified hearing loss type        Plan:       1. Gastroesophageal reflux disease, esophagitis presence not specified  Controlled  Continue current medications  Trying to reduce her dose of proton pump inhibitor was discussed.  - pantoprazole (PROTONIX) 40 MG tablet; Take 1 tablet (40 mg total) by mouth once daily.  Dispense: 90 tablet; Refill: 0    2. Essential hypertension  Controlled.  Continue current medications  Continue to avoid excessive sodium  Continue home monitoring  Target blood pressure is 139/89 or less  - furosemide (LASIX) 20 MG tablet; Take 1 tablet (20 mg total) by mouth once daily.  Dispense: 90 tablet; Refill: 1  - Lipid panel; Future  - Comprehensive metabolic panel; Future    3. Chronic obstructive pulmonary disease, unspecified COPD type  Stable with Spiriva  Continue home oxygen  Continue influenza vaccines      4. Diabetes mellitus with nephropathy  Controlled  Annual foot exam recommended  Annual retinal exam recommended.   Hemoglobin A1c at 6 month  intervals  Annual lipid profile recommended  Refills done.    - pravastatin (PRAVACHOL) 40 MG tablet; Take 1 tablet (40 mg total) by mouth once daily.  Dispense: 90 tablet; Refill: 3  - metOLazone (ZAROXOLYN) 2.5 MG tablet; Take 1 tablet (2.5 mg total) by mouth once daily.  Dispense: 30 tablet; Refill: 2  - glipiZIDE (GLUCOTROL) 5 MG TR24; Take 1 tablet (5 mg total) by mouth daily with breakfast.  Dispense: 90 tablet; Refill: 3  - Hemoglobin A1c; Future  - Comprehensive metabolic panel; Future    5. CKD (chronic kidney disease) stage 3, GFR 30-59 ml/min  Thought to be stable  Update PTH  Continue to avoid nonsteroidal anti-inflammatory medications  Continue to control blood pressure    - PTH, intact; Future    6. Chronic gout without tophus, unspecified cause, unspecified site  No recent flares, well controlled  Continue current medications  Update labs    - allopurinol (ZYLOPRIM) 100 MG tablet; Take 1 tablet (100 mg total) by mouth once daily.  Dispense: 90 tablet; Refill: 3  - Comprehensive metabolic panel; Future    7. Venous insufficiency (chronic) (peripheral)  Not wearing compression stockings  The changes that she has been scanned do not bother her much, she is not worried about the pigmentation changes  She is encouraged to consider wearing compression stockings.    8. Bilateral hearing loss, unspecified hearing loss type  Acceptable hearing

## 2019-01-19 ENCOUNTER — PATIENT MESSAGE (OUTPATIENT)
Dept: FAMILY MEDICINE | Facility: CLINIC | Age: 84
End: 2019-01-19

## 2019-01-30 NOTE — PROGRESS NOTES
Pre-visit Health Maintenance Review     [Breast Self Exam] : breast self exam [Exercise] : exercise [Vitamins/Supplements] : vitamins/supplements [Contraception] : contraception [Safe Sexual Practices] : safe sexual practices [Weight Management] : weight management [FreeTextEntry2] : Pt stating  "sterile" issues.

## 2019-02-09 DIAGNOSIS — J44.9 CHRONIC OBSTRUCTIVE PULMONARY DISEASE, UNSPECIFIED COPD TYPE: ICD-10-CM

## 2019-02-09 RX ORDER — TIOTROPIUM BROMIDE 18 UG/1
CAPSULE ORAL; RESPIRATORY (INHALATION)
Qty: 90 CAPSULE | Refills: 2 | Status: SHIPPED | OUTPATIENT
Start: 2019-02-09 | End: 2020-07-09

## 2019-03-04 DIAGNOSIS — E11.21 DIABETES MELLITUS WITH NEPHROPATHY: Chronic | ICD-10-CM

## 2019-03-04 RX ORDER — LISINOPRIL 2.5 MG/1
2.5 TABLET ORAL DAILY
Qty: 90 TABLET | Refills: 3 | Status: SHIPPED | OUTPATIENT
Start: 2019-03-04 | End: 2020-02-27

## 2019-03-04 RX ORDER — METOLAZONE 2.5 MG/1
TABLET ORAL
Qty: 90 TABLET | Refills: 1 | Status: SHIPPED | OUTPATIENT
Start: 2019-03-04 | End: 2019-04-22 | Stop reason: SDUPTHER

## 2019-03-04 NOTE — TELEPHONE ENCOUNTER
----- Message from Barbara Jacobsen sent at 3/4/2019  1:18 PM CST -----  Type:  RX Refill Request    Who Called:  Daughter Janet  Refill or New Rx:  refill  RX Name and Strength:  lisinopril (PRINIVIL,ZESTRIL) 2.5 MG tablet  How is the patient currently taking it? (ex. 1XDay):  1X Day  Is this a 30 day or 90 day RX:  90  Preferred Pharmacy with phone number:    [facility] [address] [address]  Phone: [phone] Fax: [phone]  Local or Mail Order:  Mail order  Ordering Provider:  Previously ordered by Dr Deandre barreto, she is requesting you fill it  Best Call Back Number:  [phone]  Additional Information:  Thank you!

## 2019-03-06 ENCOUNTER — TELEPHONE (OUTPATIENT)
Dept: AUDIOLOGY | Facility: CLINIC | Age: 84
End: 2019-03-06

## 2019-03-06 NOTE — TELEPHONE ENCOUNTER
LMOV letting patient know her hearing aid is back from repair and can be picked up from the second floor check in desk.

## 2019-03-13 ENCOUNTER — CLINICAL SUPPORT (OUTPATIENT)
Dept: AUDIOLOGY | Facility: CLINIC | Age: 84
End: 2019-03-13
Payer: MEDICARE

## 2019-03-13 NOTE — PROGRESS NOTES
The patient was issued her repaired left hearing aid.  A listening check of the right aid revealed it to sound good.  The patient paid in full for the left hearing aid repair.  The patient scheduled an annual audiological evaluation.

## 2019-03-16 DIAGNOSIS — I10 ESSENTIAL HYPERTENSION: ICD-10-CM

## 2019-03-17 RX ORDER — FUROSEMIDE 20 MG/1
TABLET ORAL
Qty: 90 TABLET | Refills: 1 | Status: SHIPPED | OUTPATIENT
Start: 2019-03-17 | End: 2019-09-12

## 2019-04-20 DIAGNOSIS — K21.9 GASTROESOPHAGEAL REFLUX DISEASE, ESOPHAGITIS PRESENCE NOT SPECIFIED: ICD-10-CM

## 2019-04-21 ENCOUNTER — PATIENT MESSAGE (OUTPATIENT)
Dept: FAMILY MEDICINE | Facility: CLINIC | Age: 84
End: 2019-04-21

## 2019-04-21 DIAGNOSIS — E11.21 DIABETES MELLITUS WITH NEPHROPATHY: Chronic | ICD-10-CM

## 2019-04-21 DIAGNOSIS — J44.9 CHRONIC OBSTRUCTIVE PULMONARY DISEASE, UNSPECIFIED COPD TYPE: ICD-10-CM

## 2019-04-21 RX ORDER — PANTOPRAZOLE SODIUM 40 MG/1
TABLET, DELAYED RELEASE ORAL
Qty: 90 TABLET | Refills: 0 | Status: SHIPPED | OUTPATIENT
Start: 2019-04-21 | End: 2019-04-22 | Stop reason: SDUPTHER

## 2019-04-22 DIAGNOSIS — K21.9 GASTROESOPHAGEAL REFLUX DISEASE, ESOPHAGITIS PRESENCE NOT SPECIFIED: ICD-10-CM

## 2019-04-22 RX ORDER — METOLAZONE 2.5 MG/1
TABLET ORAL
Qty: 90 TABLET | Refills: 1 | Status: SHIPPED | OUTPATIENT
Start: 2019-04-22 | End: 2020-07-16

## 2019-04-22 RX ORDER — PANTOPRAZOLE SODIUM 40 MG/1
40 TABLET, DELAYED RELEASE ORAL DAILY
Qty: 90 TABLET | Refills: 1 | Status: SHIPPED | OUTPATIENT
Start: 2019-04-22 | End: 2020-01-14

## 2019-04-22 RX ORDER — FLUTICASONE PROPIONATE AND SALMETEROL 100; 50 UG/1; UG/1
1 POWDER RESPIRATORY (INHALATION) 2 TIMES DAILY
Qty: 180 EACH | Refills: 3 | Status: ON HOLD | OUTPATIENT
Start: 2019-04-22 | End: 2020-08-19

## 2019-05-07 ENCOUNTER — OFFICE VISIT (OUTPATIENT)
Dept: FAMILY MEDICINE | Facility: CLINIC | Age: 84
End: 2019-05-07
Payer: MEDICARE

## 2019-05-07 VITALS
RESPIRATION RATE: 16 BRPM | BODY MASS INDEX: 34.75 KG/M2 | WEIGHT: 184.06 LBS | HEIGHT: 61 IN | OXYGEN SATURATION: 95 % | HEART RATE: 91 BPM | DIASTOLIC BLOOD PRESSURE: 64 MMHG | SYSTOLIC BLOOD PRESSURE: 122 MMHG

## 2019-05-07 DIAGNOSIS — E11.21 DIABETES MELLITUS WITH NEPHROPATHY: Primary | Chronic | ICD-10-CM

## 2019-05-07 DIAGNOSIS — I87.2 VENOUS INSUFFICIENCY (CHRONIC) (PERIPHERAL): ICD-10-CM

## 2019-05-07 DIAGNOSIS — N18.30 CKD (CHRONIC KIDNEY DISEASE) STAGE 3, GFR 30-59 ML/MIN: ICD-10-CM

## 2019-05-07 DIAGNOSIS — J44.9 CHRONIC OBSTRUCTIVE PULMONARY DISEASE, UNSPECIFIED COPD TYPE: Chronic | ICD-10-CM

## 2019-05-07 DIAGNOSIS — H90.6 MIXED CONDUCTIVE AND SENSORINEURAL HEARING LOSS OF BOTH EARS: Chronic | ICD-10-CM

## 2019-05-07 PROCEDURE — 99999 PR PBB SHADOW E&M-EST. PATIENT-LVL IV: ICD-10-PCS | Mod: PBBFAC,,, | Performed by: EMERGENCY MEDICINE

## 2019-05-07 PROCEDURE — 99999 PR PBB SHADOW E&M-EST. PATIENT-LVL IV: CPT | Mod: PBBFAC,,, | Performed by: EMERGENCY MEDICINE

## 2019-05-07 PROCEDURE — 99213 PR OFFICE/OUTPT VISIT, EST, LEVL III, 20-29 MIN: ICD-10-PCS | Mod: S$PBB,,, | Performed by: EMERGENCY MEDICINE

## 2019-05-07 PROCEDURE — 99213 OFFICE O/P EST LOW 20 MIN: CPT | Mod: S$PBB,,, | Performed by: EMERGENCY MEDICINE

## 2019-05-07 PROCEDURE — 99214 OFFICE O/P EST MOD 30 MIN: CPT | Mod: PBBFAC,PO | Performed by: EMERGENCY MEDICINE

## 2019-05-07 NOTE — PROGRESS NOTES
Subjective:   THIS NOTE IS DONE WITH VOICE RECOGNITION        Patient ID: Bel Oquendo is a 86 y.o. female.    Chief Complaint: Fall      HPI   She is in today to follow-up on a number of issues, predominantly her COPD.    Unfortunately she did have a fall a week ago.  She tripped over her oxygen tubing.  She is using her oxygen pretty much full-time at low-dose.  She does not have new symptoms with her chronic obstructive lung disease.  She is not smoking.  She using very little albuterol.      With her fall, she does not have any persistent new symptoms.  She denies any significant wrist or hip pain.  She may have some soreness in 1 of her shoulders.  She does not have significant bruising.    Her diabetes is doing well.  She does have mild nephropathy which is currently well controlled.  Her most recent triglycerides which were in January were significantly elevated for reasons that are not entirely clear.  She thinks of was likely behavioral.    Lab Results   Component Value Date    HGBA1C 6.1 (H) 05/07/2019    HGBA1C 6.7 (H) 01/08/2019    HGBA1C 7.1 (H) 06/01/2017       Lab Results   Component Value Date    LDLCALC Invalid, Trig>400.0 01/08/2019    LDLCALC 91.4 07/17/2018    LDLCALC 108.8 02/26/2016       Lab Results   Component Value Date    CREATININE 1.2 05/07/2019    CREATININE 1.2 01/08/2019    CREATININE 1.5 (H) 07/17/2018       Lab Results   Component Value Date    EGFRNONAA 41.0 (A) 05/07/2019    EGFRNONAA 41.0 (A) 01/08/2019    EGFRNONAA 31.5 (A) 07/17/2018     No issues with hyperuricemia or any flares of gout.        Current Outpatient Medications   Medication Sig Dispense Refill    albuterol (PROAIR HFA) 90 mcg/actuation inhaler USE 1 TO 2 INHALATIONS EVERY 6 HOURS AS NEEDED FOR WHEEZING OR SHORTNESS OF BREATH 25.5 g 3    allopurinol (ZYLOPRIM) 100 MG tablet Take 1 tablet (100 mg total) by mouth once daily. 90 tablet 3    aspirin (ECOTRIN) 81 MG EC tablet Every day      BD ULTRA-FINE II LANCETS  30 gauge Misc USE ONCE DAILY 100 each 3    CONTOUR NEXT STRIPS Strp USE ONE STRIP DAILY TO TEST BLOOD SUGAR 100 strip 2    fish oil-omega-3 fatty acids 300-1,000 mg capsule Take 2 g by mouth once daily.        fluticasone-salmeterol diskus inhaler 100-50 mcg Inhale 1 puff into the lungs 2 (two) times daily. 180 each 3    furosemide (LASIX) 20 MG tablet Take 1 tablet (20 mg total) by mouth once daily. 90 tablet 1    furosemide (LASIX) 20 MG tablet TAKE 1 TABLET DAILY 90 tablet 1    glipiZIDE (GLUCOTROL) 5 MG TR24 Take 1 tablet (5 mg total) by mouth daily with breakfast. 90 tablet 3    GLUCOSAM HCL/MSM/CHONDRO SCRUGGS A (GLUCOSAMINE HCL-MSM-CHONDROITN) 400-200-333 mg Tab Every day      lisinopril (PRINIVIL,ZESTRIL) 2.5 MG tablet Take 1 tablet (2.5 mg total) by mouth once daily. 90 tablet 3    metOLazone (ZAROXOLYN) 2.5 MG tablet One or two tablets per WEEK if needed for edema. 90 tablet 1    MILK THISTLE ORAL Take 1 tablet by mouth once daily.       multivitamin (THERAGRAN) per tablet Take 1 tablet by mouth once daily.      mv-mn/iron/folic acid/herb 190 (VITAMIN D3 COMPLETE ORAL) Take by mouth.      OXYGEN-AIR DELIVERY SYSTEMS MISC by [facility].(Non-Drug; Combo Route) route continuous.      pantoprazole (PROTONIX) 40 MG tablet Take 1 tablet (40 mg total) by mouth once daily. 90 tablet 1    pravastatin (PRAVACHOL) 40 MG tablet Take 1 tablet (40 mg total) by mouth once daily. 90 tablet 3    SPIRIVA WITH HANDIHALER 18 mcg inhalation capsule INHALE THE CONTENTS OF 1 CAPSULE DAILY 90 capsule 2    azelastine (ASTELIN) 137 mcg (0.1 %) nasal spray 1 spray (137 mcg total) by Nasal route 2 (two) times daily. 30 mL 12     No current facility-administered medications for this visit.          Review of Systems   Constitutional: Negative for activity change, appetite change, chills, diaphoresis, fatigue, fever and unexpected weight change.   HENT: Negative for congestion, ear pain, hearing loss, rhinorrhea, trouble  swallowing and voice change.    Eyes: Negative for pain and visual disturbance.   Respiratory: Positive for shortness of breath. Negative for cough and chest tightness.    Cardiovascular: Negative for chest pain, palpitations and leg swelling.   Gastrointestinal: Negative for abdominal distention, abdominal pain and blood in stool.   Genitourinary: Negative for difficulty urinating, flank pain, frequency and urgency.        Vaginal itch   Musculoskeletal: Positive for arthralgias and back pain. Negative for joint swelling, myalgias, neck pain and neck stiffness.   Skin: Negative for pallor and rash.   Neurological: Negative for dizziness, tremors, syncope, weakness and headaches.   Hematological: Negative for adenopathy.   Psychiatric/Behavioral: Negative for dysphoric mood and sleep disturbance. The patient is not nervous/anxious.        Objective:      Physical Exam   Constitutional: She is oriented to person, place, and time. She appears well-developed and well-nourished. No distress.   She is using supplemental oxygen at 2-3.  Oxygen saturation was obtained with oxygen in place and without exercise.   HENT:   Head: Normocephalic and atraumatic.   Nose: Nose normal.   Mouth/Throat: Oropharynx is clear and moist.   Eyes: Pupils are equal, round, and reactive to light. Conjunctivae and EOM are normal. No scleral icterus.   Neck: Normal range of motion. Neck supple. No JVD present. No thyromegaly present.   Cardiovascular: Normal rate, regular rhythm, normal heart sounds and intact distal pulses.   No murmur heard.  Pulses:       Dorsalis pedis pulses are 2+ on the right side, and 2+ on the left side.        Posterior tibial pulses are 2+ on the right side, and 2+ on the left side.   Pulmonary/Chest: Effort normal. No accessory muscle usage. No respiratory distress. She has decreased breath sounds (Symmetrically, not unusual for her.). She has no wheezes. She has no rales.   Abdominal: Soft. Bowel sounds are normal.  She exhibits no distension. There is no tenderness.   Musculoskeletal: She exhibits no edema.        Right shoulder: She exhibits decreased range of motion and tenderness.        Left shoulder: She exhibits normal range of motion, no tenderness and no bony tenderness.        Right foot: There is no deformity.        Left foot: There is no deformity.   Feet:   Right Foot:   Protective Sensation: 5 sites tested. 5 sites sensed.   Skin Integrity: Negative for skin breakdown.   Left Foot:   Protective Sensation: 5 sites tested. 5 sites sensed.   Skin Integrity: Negative for skin breakdown.   Lymphadenopathy:     She has no cervical adenopathy.   Neurological: She is alert and oriented to person, place, and time. She has normal reflexes. No cranial nerve deficit. Coordination normal.   Skin: Skin is warm and dry. No rash noted. No erythema.   Psychiatric: She has a normal mood and affect. Her behavior is normal.   Vitals reviewed.      Assessment:       1. Diabetes mellitus with nephropathy    2. CKD (chronic kidney disease) stage 3, GFR 30-59 ml/min    3. Chronic obstructive pulmonary disease, unspecified COPD type    4. Venous insufficiency (chronic) (peripheral)    5. Mixed conductive and sensorineural hearing loss of both ears        Plan:       1. Diabetes mellitus with nephropathy  Update hemoglobin A1c  Encouraged to maintain careful observation of her feet  Encouraged to see Ophthalmology at least once a year    - Hemoglobin A1c; Future    2. CKD (chronic kidney disease) stage 3, GFR 30-59 ml/min  Stable  Continue current medications  Avoid nonsteroidal medications  Avoid excessive salt    - Renal function panel; Future    3. Chronic obstructive pulmonary disease, unspecified COPD type  Stable  Continue supplemental oxygen full-time  Regular monitoring of status recommended  No new medications added    4. Venous insufficiency (chronic) (peripheral)  Remains problematic, mildly so  Encourage compression  stockings  Encouraged regular walking    5. Mixed conductive and sensorineural hearing loss of both ears  Continued issues  She is considering updating her audiology screening  She does have hearing aids in place, but they are not satisfactory.    In regards to her shoulder pain, recommended that she continue range-of-motion exercises.  Clinical findings today did not suggest any bony abnormalities, we will continue to monitor.

## 2019-05-08 ENCOUNTER — CLINICAL SUPPORT (OUTPATIENT)
Dept: AUDIOLOGY | Facility: CLINIC | Age: 84
End: 2019-05-08
Payer: MEDICARE

## 2019-05-08 ENCOUNTER — OFFICE VISIT (OUTPATIENT)
Dept: OTOLARYNGOLOGY | Facility: CLINIC | Age: 84
End: 2019-05-08
Payer: MEDICARE

## 2019-05-08 VITALS — BODY MASS INDEX: 34.75 KG/M2 | HEIGHT: 61 IN | WEIGHT: 184.06 LBS

## 2019-05-08 DIAGNOSIS — H61.23 BILATERAL IMPACTED CERUMEN: ICD-10-CM

## 2019-05-08 DIAGNOSIS — H93.293 IMPAIRED AUDITORY DISCRIMINATION, BILATERAL: ICD-10-CM

## 2019-05-08 DIAGNOSIS — H90.3 SENSORINEURAL HEARING LOSS (SNHL) OF BOTH EARS: Primary | ICD-10-CM

## 2019-05-08 DIAGNOSIS — H91.90 HEARING DIFFICULTY, UNSPECIFIED LATERALITY: Primary | ICD-10-CM

## 2019-05-08 DIAGNOSIS — Z97.4 WEARS HEARING AID IN BOTH EARS: ICD-10-CM

## 2019-05-08 DIAGNOSIS — H90.3 BILATERAL SENSORINEURAL HEARING LOSS: Primary | ICD-10-CM

## 2019-05-08 PROCEDURE — 99999 PR PBB SHADOW E&M-EST. PATIENT-LVL III: CPT | Mod: PBBFAC,,, | Performed by: NURSE PRACTITIONER

## 2019-05-08 PROCEDURE — 99213 OFFICE O/P EST LOW 20 MIN: CPT | Mod: PBBFAC,PO | Performed by: NURSE PRACTITIONER

## 2019-05-08 PROCEDURE — 92552 PURE TONE AUDIOMETRY AIR: CPT | Mod: PBBFAC,PO | Performed by: AUDIOLOGIST

## 2019-05-08 PROCEDURE — 99213 PR OFFICE/OUTPT VISIT, EST, LEVL III, 20-29 MIN: ICD-10-PCS | Mod: 25,S$PBB,, | Performed by: NURSE PRACTITIONER

## 2019-05-08 PROCEDURE — 99999 PR PBB SHADOW E&M-EST. PATIENT-LVL III: ICD-10-PCS | Mod: PBBFAC,,, | Performed by: NURSE PRACTITIONER

## 2019-05-08 PROCEDURE — 99999 PR PBB SHADOW E&M-EST. PATIENT-LVL I: ICD-10-PCS | Mod: PBBFAC,,,

## 2019-05-08 PROCEDURE — 92556 SPEECH AUDIOMETRY COMPLETE: CPT | Mod: PBBFAC,PO | Performed by: AUDIOLOGIST

## 2019-05-08 PROCEDURE — 92567 TYMPANOMETRY: CPT | Mod: PBBFAC,PO | Performed by: AUDIOLOGIST

## 2019-05-08 PROCEDURE — 99213 OFFICE O/P EST LOW 20 MIN: CPT | Mod: 25,S$PBB,, | Performed by: NURSE PRACTITIONER

## 2019-05-08 PROCEDURE — 99999 PR PBB SHADOW E&M-EST. PATIENT-LVL I: CPT | Mod: PBBFAC,,,

## 2019-05-08 PROCEDURE — G0268 REMOVAL OF IMPACTED WAX MD: HCPCS | Mod: PBBFAC,PO | Performed by: NURSE PRACTITIONER

## 2019-05-08 PROCEDURE — 99211 OFF/OP EST MAY X REQ PHY/QHP: CPT | Mod: PBBFAC,27,PO,25

## 2019-05-08 PROCEDURE — G0268 REMOVAL OF IMPACTED WAX MD: HCPCS | Mod: S$PBB,,, | Performed by: NURSE PRACTITIONER

## 2019-05-08 PROCEDURE — G0268 PR REMOVAL OF IMPACTED WAX MD: ICD-10-PCS | Mod: S$PBB,,, | Performed by: NURSE PRACTITIONER

## 2019-05-08 NOTE — PROGRESS NOTES
The patient was seen for a hearing aid follow-up appointment after having a hearing evaluation performed today.  Hearing thresholds from today's evaluation were entered into the hearing aid software, and the programming for the left and right hearing aid was recalculated using the current hearing thresholds.  The right and left hearing aid was cleaned and checked.  A listening check revealed each aid to sound good.    An annual audiological evaluation was recommended.  The patient will contact us as needed.

## 2019-05-09 NOTE — PROGRESS NOTES
Subjective:       Patient ID: Bel Oquendo is a 86 y.o. female.    Chief Complaint: Cerumen Impaction and Hearing Loss    HPI   Patient returns for annual audiogram but needs ear cleaning first. She would also like hearing aids checked and cleaned as they are full of wax. She denies otalgia, otorrhea, or other ENT symptoms or concerns at this time.     Review of Systems   Constitutional: Negative.    HENT: Positive for hearing loss.    Eyes: Negative.    Respiratory: Negative.    Cardiovascular: Negative.    Gastrointestinal: Negative.    Musculoskeletal: Negative.    Skin: Negative.    Neurological: Negative.    Hematological: Negative.    Psychiatric/Behavioral: Negative.        Objective:      Physical Exam   Constitutional: She is oriented to person, place, and time. Vital signs are normal. She appears well-developed and well-nourished. She is cooperative. She does not appear ill. No distress.   HENT:   Head: Normocephalic and atraumatic.   Right Ear: Hearing, tympanic membrane, external ear and ear canal normal. Tympanic membrane is not erythematous. No middle ear effusion.   Left Ear: Hearing, tympanic membrane, external ear and ear canal normal. Tympanic membrane is not erythematous.  No middle ear effusion.   Nose: Nose normal. No mucosal edema or rhinorrhea.   Mouth/Throat: Uvula is midline, oropharynx is clear and moist and mucous membranes are normal.     SEPARATE PROCEDURE IN OFFICE:   Procedure: Removal of impacted cerumen, bilateral   Pre Procedure Diagnosis: Cerumen Impaction   Post Procedure Diagnosis: Cerumen Impaction   Verbal informed consent in regards to risk of trauma to ear canal, ear drum or hearing, discomfort during procedure and/or inability to remove cerumen impaction in one session or unforeseen events or complications.   No anesthesia.     Procedure in detail:   Ear canal visualized bilateral with appropriate size ear speculum utilizing Operating Head Binocular Otomicroscope   Utilizing  the following: Ring curet, delicate alligator forceps AU. The impacted cerumen of the ear canals was removed atraumatically. The TM and EAC were then inspected and found to be clear of wax. See description of TMs/EACs in PE above.   Complications: No   Condition: Improved/Good     Eyes: EOM and lids are normal. Right eye exhibits no discharge. Left eye exhibits no discharge. No scleral icterus.   Neck: Trachea normal and normal range of motion. Neck supple. No tracheal deviation present.   Cardiovascular: Normal rate.   Pulmonary/Chest: Effort normal. No stridor. No respiratory distress. She has no wheezes.   Musculoskeletal: Normal range of motion.   Neurological: She is alert and oriented to person, place, and time. She has normal strength. Coordination and gait normal.   Skin: Skin is warm, dry and intact. No lesion and no rash noted. She is not diaphoretic. No cyanosis. No pallor.   Psychiatric: She has a normal mood and affect. Her speech is normal and behavior is normal. Judgment and thought content normal. Cognition and memory are normal.   Nursing note and vitals reviewed.      Assessment:     Bilateral cerumen impactions removed    Bilateral SNHL, moderate to severe, wears hearing aids AU  Plan:     Recommend continued use of binaural amplification and annual audiograms    Return to clinic as needed for further ENT concerns

## 2019-08-07 ENCOUNTER — TELEPHONE (OUTPATIENT)
Dept: FAMILY MEDICINE | Facility: CLINIC | Age: 84
End: 2019-08-07

## 2019-08-07 DIAGNOSIS — M1A.9XX0 CHRONIC GOUT WITHOUT TOPHUS, UNSPECIFIED CAUSE, UNSPECIFIED SITE: ICD-10-CM

## 2019-08-07 DIAGNOSIS — E11.9 TYPE 2 DIABETES MELLITUS WITHOUT COMPLICATION, WITHOUT LONG-TERM CURRENT USE OF INSULIN: Primary | Chronic | ICD-10-CM

## 2019-08-07 DIAGNOSIS — E11.21 DIABETES MELLITUS WITH NEPHROPATHY: Chronic | ICD-10-CM

## 2019-08-07 NOTE — TELEPHONE ENCOUNTER
----- Message from Ivone Anaya sent at 8/7/2019  3:23 PM CDT -----  Contact: Daughter Janet  Patients daughter is requesting a call back to see when her mother is due to see the doctor.  Call back at [phone].  Thanks

## 2019-08-07 NOTE — TELEPHONE ENCOUNTER
I spoke with daughter and set up 4 month follow up appt.   She asked if she needed any labs? Renal panel, and hgba1c done 5/7/19

## 2019-09-07 ENCOUNTER — TELEPHONE (OUTPATIENT)
Dept: FAMILY MEDICINE | Facility: CLINIC | Age: 84
End: 2019-09-07

## 2019-09-07 ENCOUNTER — LAB VISIT (OUTPATIENT)
Dept: LAB | Facility: HOSPITAL | Age: 84
End: 2019-09-07
Attending: EMERGENCY MEDICINE
Payer: MEDICARE

## 2019-09-07 DIAGNOSIS — E11.21 DIABETES MELLITUS WITH NEPHROPATHY: Chronic | ICD-10-CM

## 2019-09-07 DIAGNOSIS — M1A.9XX0 CHRONIC GOUT WITHOUT TOPHUS, UNSPECIFIED CAUSE, UNSPECIFIED SITE: ICD-10-CM

## 2019-09-07 LAB
ALBUMIN SERPL BCP-MCNC: 3.2 G/DL (ref 3.5–5.2)
ANION GAP SERPL CALC-SCNC: 7 MMOL/L (ref 8–16)
BASOPHILS # BLD AUTO: 0.03 K/UL (ref 0–0.2)
BASOPHILS NFR BLD: 0.3 % (ref 0–1.9)
BUN SERPL-MCNC: 32 MG/DL (ref 8–23)
CALCIUM SERPL-MCNC: 10.4 MG/DL (ref 8.7–10.5)
CHLORIDE SERPL-SCNC: 92 MMOL/L (ref 95–110)
CO2 SERPL-SCNC: 42 MMOL/L (ref 23–29)
CREAT SERPL-MCNC: 1.3 MG/DL (ref 0.5–1.4)
DIFFERENTIAL METHOD: ABNORMAL
EOSINOPHIL # BLD AUTO: 0.4 K/UL (ref 0–0.5)
EOSINOPHIL NFR BLD: 3.1 % (ref 0–8)
ERYTHROCYTE [DISTWIDTH] IN BLOOD BY AUTOMATED COUNT: 14.6 % (ref 11.5–14.5)
EST. GFR  (AFRICAN AMERICAN): 42.6 ML/MIN/1.73 M^2
EST. GFR  (NON AFRICAN AMERICAN): 37 ML/MIN/1.73 M^2
ESTIMATED AVG GLUCOSE: 128 MG/DL (ref 68–131)
GLUCOSE SERPL-MCNC: 226 MG/DL (ref 70–110)
HBA1C MFR BLD HPLC: 6.1 % (ref 4–5.6)
HCT VFR BLD AUTO: 33.3 % (ref 37–48.5)
HGB BLD-MCNC: 9.3 G/DL (ref 12–16)
IMM GRANULOCYTES # BLD AUTO: 0.09 K/UL (ref 0–0.04)
IMM GRANULOCYTES NFR BLD AUTO: 0.8 % (ref 0–0.5)
LYMPHOCYTES # BLD AUTO: 1.1 K/UL (ref 1–4.8)
LYMPHOCYTES NFR BLD: 9.9 % (ref 18–48)
MCH RBC QN AUTO: 28.4 PG (ref 27–31)
MCHC RBC AUTO-ENTMCNC: 27.9 G/DL (ref 32–36)
MCV RBC AUTO: 102 FL (ref 82–98)
MONOCYTES # BLD AUTO: 0.9 K/UL (ref 0.3–1)
MONOCYTES NFR BLD: 8.1 % (ref 4–15)
NEUTROPHILS # BLD AUTO: 8.9 K/UL (ref 1.8–7.7)
NEUTROPHILS NFR BLD: 77.8 % (ref 38–73)
NRBC BLD-RTO: 0 /100 WBC
PHOSPHATE SERPL-MCNC: 3.6 MG/DL (ref 2.7–4.5)
PLATELET # BLD AUTO: 151 K/UL (ref 150–350)
PMV BLD AUTO: 13.4 FL (ref 9.2–12.9)
POTASSIUM SERPL-SCNC: 5.1 MMOL/L (ref 3.5–5.1)
RBC # BLD AUTO: 3.27 M/UL (ref 4–5.4)
SODIUM SERPL-SCNC: 141 MMOL/L (ref 136–145)
URATE SERPL-MCNC: 9 MG/DL (ref 2.4–5.7)
WBC # BLD AUTO: 11.47 K/UL (ref 3.9–12.7)

## 2019-09-07 PROCEDURE — 84550 ASSAY OF BLOOD/URIC ACID: CPT

## 2019-09-07 PROCEDURE — 36415 COLL VENOUS BLD VENIPUNCTURE: CPT | Mod: PO

## 2019-09-07 PROCEDURE — 80069 RENAL FUNCTION PANEL: CPT

## 2019-09-07 PROCEDURE — 85025 COMPLETE CBC W/AUTO DIFF WBC: CPT

## 2019-09-07 PROCEDURE — 83036 HEMOGLOBIN GLYCOSYLATED A1C: CPT

## 2019-09-07 NOTE — TELEPHONE ENCOUNTER
I received a call this evening for crtical co2 of 42. pts last value 8 months ago was 39. I attempted to call patient's home and mobile numbers but there was no answer and would not connect to voicemail. I also called the phone number for patients daughter donny without an answer.  Pt has appt already scheduled this coming week with dr coulter

## 2019-09-12 ENCOUNTER — OFFICE VISIT (OUTPATIENT)
Dept: FAMILY MEDICINE | Facility: CLINIC | Age: 84
End: 2019-09-12
Payer: MEDICARE

## 2019-09-12 VITALS
RESPIRATION RATE: 16 BRPM | HEIGHT: 61 IN | BODY MASS INDEX: 34.55 KG/M2 | DIASTOLIC BLOOD PRESSURE: 70 MMHG | SYSTOLIC BLOOD PRESSURE: 106 MMHG | OXYGEN SATURATION: 91 % | WEIGHT: 183 LBS | HEART RATE: 88 BPM

## 2019-09-12 DIAGNOSIS — N18.30 ANEMIA DUE TO STAGE 3 CHRONIC KIDNEY DISEASE: ICD-10-CM

## 2019-09-12 DIAGNOSIS — I10 ESSENTIAL HYPERTENSION: Chronic | ICD-10-CM

## 2019-09-12 DIAGNOSIS — D63.1 ANEMIA DUE TO STAGE 3 CHRONIC KIDNEY DISEASE: ICD-10-CM

## 2019-09-12 DIAGNOSIS — E11.21 DIABETES MELLITUS WITH NEPHROPATHY: Chronic | ICD-10-CM

## 2019-09-12 DIAGNOSIS — N18.30 CKD (CHRONIC KIDNEY DISEASE) STAGE 3, GFR 30-59 ML/MIN: Chronic | ICD-10-CM

## 2019-09-12 DIAGNOSIS — M1A.9XX0 CHRONIC GOUT WITHOUT TOPHUS, UNSPECIFIED CAUSE, UNSPECIFIED SITE: Chronic | ICD-10-CM

## 2019-09-12 DIAGNOSIS — J44.9 CHRONIC OBSTRUCTIVE PULMONARY DISEASE, UNSPECIFIED COPD TYPE: Primary | Chronic | ICD-10-CM

## 2019-09-12 PROCEDURE — 99999 PR PBB SHADOW E&M-EST. PATIENT-LVL V: CPT | Mod: PBBFAC,,, | Performed by: EMERGENCY MEDICINE

## 2019-09-12 PROCEDURE — 90662 IIV NO PRSV INCREASED AG IM: CPT | Mod: PBBFAC,PO

## 2019-09-12 PROCEDURE — 99999 PR PBB SHADOW E&M-EST. PATIENT-LVL V: ICD-10-PCS | Mod: PBBFAC,,, | Performed by: EMERGENCY MEDICINE

## 2019-09-12 PROCEDURE — 99215 OFFICE O/P EST HI 40 MIN: CPT | Mod: PBBFAC,PO | Performed by: EMERGENCY MEDICINE

## 2019-09-12 PROCEDURE — 99214 OFFICE O/P EST MOD 30 MIN: CPT | Mod: S$PBB,,, | Performed by: EMERGENCY MEDICINE

## 2019-09-12 PROCEDURE — 99214 PR OFFICE/OUTPT VISIT, EST, LEVL IV, 30-39 MIN: ICD-10-PCS | Mod: S$PBB,,, | Performed by: EMERGENCY MEDICINE

## 2019-09-12 RX ORDER — ALLOPURINOL 100 MG/1
200 TABLET ORAL DAILY
Qty: 180 TABLET | Refills: 3 | Status: SHIPPED | OUTPATIENT
Start: 2019-09-12 | End: 2020-08-19

## 2019-09-22 NOTE — PATIENT INSTRUCTIONS
Bel,    You're doing quite well.  Your diabetes control is excellent.  If you're having any low blood sugars I would definitely need to know.  A low blood sugar is anything less than 70.    If you're having low blood pressures on a regular blood pressure somethin less than 110 on the top, we may need to reduce her medications.  It is not uncommon for people to become more sensitive to there blood pressure medications as they age, and I do not want you to be over treated.    Your vaccines are current for your chronic obstructive pulmonary disease. You should continue to use her oxygen.      An last, but very important, is that we need to continue to monitor your kidneys.  Please avoid all medications like ibuprofen, Naprosyn, Aleve, Motrin, and other similar medications.  We will need to monitor your blood count and your kidney function going forward on a regular basis.  That is 3 or 4 times a year.    I would like to see you every 4 months.

## 2019-09-22 NOTE — PROGRESS NOTES
Subjective:   THIS NOTE IS DONE WITH VOICE RECOGNITION        Patient ID: Bel Oquendo is a 87 y.o. female.    Chief Complaint: Hypertension      HPI     She is in a my requested follow-up on multiple comorbidities.    Here to follow up on blood pressure.  Taking current medications.    BP Readings from Last 3 Encounters:   09/12/19 106/70   05/07/19 122/64   01/08/19 112/60     In she has not had recognized hypo tension.  No falls.  Her cognitive function is stable.    She does have diabetes is well controlled.  She does not describe any hypoglycemia.  She brings in blood pressure and blood sugar log sitter both quite good.    Lab Results   Component Value Date    HGBA1C 6.1 (H) 09/07/2019    HGBA1C 6.1 (H) 05/07/2019    HGBA1C 6.7 (H) 01/08/2019       Lab Results   Component Value Date    LDLCALC Invalid, Trig>400.0 01/08/2019    LDLCALC 91.4 07/17/2018    LDLCALC 108.8 02/26/2016       Lab Results   Component Value Date    CREATININE 1.3 09/07/2019    CREATININE 1.2 05/07/2019    CREATININE 1.2 01/08/2019       Lab Results   Component Value Date    EGFRNONAA 37.0 (A) 09/07/2019    EGFRNONAA 41.0 (A) 05/07/2019    EGFRNONAA 41.0 (A) 01/08/2019     She has significant chronic obstructive pulmonary disease.  She is oxygen dependent.  Since seeing me last, she has purchased her own oxygen concentrator, in using full-time at 1-2 liters/minute.  She is not experiencing trouble with her current medications.    Her gout is well controlled with low-dose allopurinol.  She has not had an attack in 2-3 years.    Immunization History   Administered Date(s) Administered    Influenza - High Dose - PF (65 years and older) 09/24/2012, 10/21/2013, 11/06/2015, 10/06/2016, 10/01/2017, 09/28/2018, 09/12/2019    Pneumococcal Conjugate - 13 Valent 11/06/2015    Pneumococcal Polysaccharide - 23 Valent 07/21/2014    Zoster 06/28/2012     Flu vaccine for the 2019 flu season administered today.    Current Outpatient Medications    Medication Sig Dispense Refill    albuterol (PROAIR HFA) 90 mcg/actuation inhaler USE 1 TO 2 INHALATIONS EVERY 6 HOURS AS NEEDED FOR WHEEZING OR SHORTNESS OF BREATH 25.5 g 3    allopurinol (ZYLOPRIM) 100 MG tablet Take 2 tablets (200 mg total) by mouth once daily. 180 tablet 3    aspirin (ECOTRIN) 81 MG EC tablet Every day      azelastine (ASTELIN) 137 mcg (0.1 %) nasal spray 1 spray (137 mcg total) by Nasal route 2 (two) times daily. 30 mL 12    BD ULTRA-FINE II LANCETS 30 gauge Misc USE ONCE DAILY 100 each 3    CONTOUR NEXT STRIPS Strp USE ONE STRIP DAILY TO TEST BLOOD SUGAR 100 strip 2    fish oil-omega-3 fatty acids 300-1,000 mg capsule Take 2 g by mouth once daily.        fluticasone-salmeterol diskus inhaler 100-50 mcg Inhale 1 puff into the lungs 2 (two) times daily. 180 each 3    furosemide (LASIX) 20 MG tablet Take 1 tablet (20 mg total) by mouth once daily. 90 tablet 1    glipiZIDE (GLUCOTROL) 5 MG TR24 Take 1 tablet (5 mg total) by mouth daily with breakfast. 90 tablet 3    GLUCOSAM HCL/MSM/CHONDRO SCRUGGS A (GLUCOSAMINE HCL-MSM-CHONDROITN) 400-200-333 mg Tab Every day      lisinopril (PRINIVIL,ZESTRIL) 2.5 MG tablet Take 1 tablet (2.5 mg total) by mouth once daily. 90 tablet 3    metOLazone (ZAROXOLYN) 2.5 MG tablet One or two tablets per WEEK if needed for edema. 90 tablet 1    MILK THISTLE ORAL Take 1 tablet by mouth once daily.       multivitamin (THERAGRAN) per tablet Take 1 tablet by mouth once daily.      mv-mn/iron/folic acid/herb 190 (VITAMIN D3 COMPLETE ORAL) Take by mouth.      OXYGEN-AIR DELIVERY SYSTEMS MISC by [facility].(Non-Drug; Combo Route) route continuous.      pantoprazole (PROTONIX) 40 MG tablet Take 1 tablet (40 mg total) by mouth once daily. 90 tablet 1    pravastatin (PRAVACHOL) 40 MG tablet Take 1 tablet (40 mg total) by mouth once daily. 90 tablet 3    SPIRIVA WITH HANDIHALER 18 mcg inhalation capsule INHALE THE CONTENTS OF 1 CAPSULE DAILY 90 capsule 2     No current  facility-administered medications for this visit.          Review of Systems   Constitutional: Positive for activity change. Negative for appetite change, chills, diaphoresis, fatigue, fever and unexpected weight change.   HENT: Negative for congestion, ear pain, hearing loss, rhinorrhea, trouble swallowing and voice change.    Eyes: Negative for pain and visual disturbance.   Respiratory: Positive for shortness of breath and wheezing (occasionally). Negative for cough and chest tightness.    Cardiovascular: Negative for chest pain, palpitations and leg swelling.   Gastrointestinal: Negative for abdominal distention, abdominal pain and blood in stool.   Genitourinary: Negative for difficulty urinating, flank pain, frequency and urgency.   Musculoskeletal: Positive for arthralgias. Negative for back pain, joint swelling, myalgias, neck pain and neck stiffness.   Skin: Negative for pallor and rash.   Neurological: Positive for weakness. Negative for dizziness, tremors, syncope and headaches.   Hematological: Negative for adenopathy.   Psychiatric/Behavioral: Negative for dysphoric mood and sleep disturbance. The patient is not nervous/anxious.        Objective:      Physical Exam   Constitutional: She is oriented to person, place, and time. She appears well-nourished. No distress.   She is seen in a wheelchair.  She is using full-time a supplemental oxygen at 1 liter/minute.  Her oxygen saturation was determined will oxygen in place.   HENT:   Head: Normocephalic.   Mouth/Throat: Oropharynx is clear and moist.   Eyes: Pupils are equal, round, and reactive to light. Conjunctivae are normal.   Neck: Normal range of motion. No JVD present.   Musculoskeletal: She exhibits edema (1+.  No compression stockings).   Neurological: She is alert and oriented to person, place, and time. No cranial nerve deficit.   Skin: Skin is warm and dry. No cyanosis. Nails show no clubbing.   Chronic venous changes noted, both lower  extremities.   Psychiatric: She has a normal mood and affect.   Vitals reviewed.      Assessment:       1. Chronic obstructive pulmonary disease, unspecified COPD type    2. Diabetes mellitus with nephropathy    3. Essential hypertension    4. CKD (chronic kidney disease) stage 3, GFR 30-59 ml/min    5. Chronic gout without tophus, unspecified cause, unspecified site    6. Anemia due to stage 3 chronic kidney disease        Plan:       1. Chronic obstructive pulmonary disease, unspecified COPD type  Now in full-time oxygen  Stable with only minimal wheezing  No recent exacerbations  Influenza vaccine current  Other immunizations current    2. Diabetes mellitus with nephropathy  Controlled  Annual foot exam recommended  Annual retinal exam recommended.  Referral for Optometry done today  Hemoglobin A1c at 6 month intervals  Annual lipid profile recommended.    - Ambulatory referral to Optometry    3. Essential hypertension  Controlled.  Continue current medications  Continue to avoid excessive sodium  Continue home monitoring  Target blood pressure is 139/89 or less    4. CKD (chronic kidney disease) stage 3, GFR 30-59 ml/min  Stable  Continue to monitor at 3 6 month intervals  Avoidance of all nonsteroidals recommended  Long-term medications review viewed with Bel.    - Renal function panel; Future    5. Chronic gout without tophus, unspecified cause, unspecified site  Controlled  No recent episodes  Continue current management    - allopurinol (ZYLOPRIM) 100 MG tablet; Take 2 tablets (200 mg total) by mouth once daily.  Dispense: 180 tablet; Refill: 3  - Uric acid; Future    6. Anemia due to stage 3 chronic kidney disease  Regular monitoring of blood count recommended  If she drops below 8, hematology consultation recommended.  Lab Results   Component Value Date    .0 (H) 01/08/2019    CALCIUM 10.4 09/07/2019    PHOS 3.6 09/07/2019     - CBC auto differential; Future

## 2019-10-23 ENCOUNTER — TELEPHONE (OUTPATIENT)
Dept: FAMILY MEDICINE | Facility: CLINIC | Age: 84
End: 2019-10-23

## 2019-10-23 NOTE — TELEPHONE ENCOUNTER
----- Message from Hemalatha Murguia sent at 10/23/2019  9:03 AM CDT -----  Contact:  Colby Castillo  Type: Needs Medical Advice    Who Called:  Colby Castillo  Best Call Back Number:    Additional Information: Calling to speak to the nurse, wanting to verify that a fax was received for the patient that needs to be signed and faxed back.

## 2019-11-05 ENCOUNTER — LAB VISIT (OUTPATIENT)
Dept: LAB | Facility: HOSPITAL | Age: 84
End: 2019-11-05
Attending: EMERGENCY MEDICINE
Payer: MEDICARE

## 2019-11-05 DIAGNOSIS — M1A.9XX0 CHRONIC GOUT WITHOUT TOPHUS, UNSPECIFIED CAUSE, UNSPECIFIED SITE: Chronic | ICD-10-CM

## 2019-11-05 DIAGNOSIS — N18.30 CKD (CHRONIC KIDNEY DISEASE) STAGE 3, GFR 30-59 ML/MIN: Chronic | ICD-10-CM

## 2019-11-05 DIAGNOSIS — N18.30 ANEMIA DUE TO STAGE 3 CHRONIC KIDNEY DISEASE: ICD-10-CM

## 2019-11-05 DIAGNOSIS — D63.1 ANEMIA DUE TO STAGE 3 CHRONIC KIDNEY DISEASE: ICD-10-CM

## 2019-11-05 LAB
ALBUMIN SERPL BCP-MCNC: 2.9 G/DL (ref 3.5–5.2)
ANION GAP SERPL CALC-SCNC: 8 MMOL/L (ref 8–16)
BASOPHILS # BLD AUTO: 0.05 K/UL (ref 0–0.2)
BASOPHILS NFR BLD: 0.5 % (ref 0–1.9)
BUN SERPL-MCNC: 23 MG/DL (ref 8–23)
CALCIUM SERPL-MCNC: 9.6 MG/DL (ref 8.7–10.5)
CHLORIDE SERPL-SCNC: 92 MMOL/L (ref 95–110)
CO2 SERPL-SCNC: 36 MMOL/L (ref 23–29)
CREAT SERPL-MCNC: 1.3 MG/DL (ref 0.5–1.4)
DIFFERENTIAL METHOD: ABNORMAL
EOSINOPHIL # BLD AUTO: 0.3 K/UL (ref 0–0.5)
EOSINOPHIL NFR BLD: 3.1 % (ref 0–8)
ERYTHROCYTE [DISTWIDTH] IN BLOOD BY AUTOMATED COUNT: 15.1 % (ref 11.5–14.5)
EST. GFR  (AFRICAN AMERICAN): 42.6 ML/MIN/1.73 M^2
EST. GFR  (NON AFRICAN AMERICAN): 37 ML/MIN/1.73 M^2
GLUCOSE SERPL-MCNC: 230 MG/DL (ref 70–110)
HCT VFR BLD AUTO: 34.3 % (ref 37–48.5)
HGB BLD-MCNC: 9.8 G/DL (ref 12–16)
IMM GRANULOCYTES # BLD AUTO: 0.21 K/UL (ref 0–0.04)
IMM GRANULOCYTES NFR BLD AUTO: 1.9 % (ref 0–0.5)
LYMPHOCYTES # BLD AUTO: 0.9 K/UL (ref 1–4.8)
LYMPHOCYTES NFR BLD: 8.3 % (ref 18–48)
MCH RBC QN AUTO: 28 PG (ref 27–31)
MCHC RBC AUTO-ENTMCNC: 28.6 G/DL (ref 32–36)
MCV RBC AUTO: 98 FL (ref 82–98)
MONOCYTES # BLD AUTO: 0.8 K/UL (ref 0.3–1)
MONOCYTES NFR BLD: 7.4 % (ref 4–15)
NEUTROPHILS # BLD AUTO: 8.8 K/UL (ref 1.8–7.7)
NEUTROPHILS NFR BLD: 78.8 % (ref 38–73)
NRBC BLD-RTO: 0 /100 WBC
PHOSPHATE SERPL-MCNC: 3 MG/DL (ref 2.7–4.5)
PLATELET # BLD AUTO: 190 K/UL (ref 150–350)
PMV BLD AUTO: 12.9 FL (ref 9.2–12.9)
POTASSIUM SERPL-SCNC: 4.6 MMOL/L (ref 3.5–5.1)
RBC # BLD AUTO: 3.5 M/UL (ref 4–5.4)
SODIUM SERPL-SCNC: 136 MMOL/L (ref 136–145)
URATE SERPL-MCNC: 5.7 MG/DL (ref 2.4–5.7)
WBC # BLD AUTO: 11.11 K/UL (ref 3.9–12.7)

## 2019-11-05 PROCEDURE — 85025 COMPLETE CBC W/AUTO DIFF WBC: CPT

## 2019-11-05 PROCEDURE — 80069 RENAL FUNCTION PANEL: CPT

## 2019-11-05 PROCEDURE — 36415 COLL VENOUS BLD VENIPUNCTURE: CPT | Mod: PO

## 2019-11-05 PROCEDURE — 84550 ASSAY OF BLOOD/URIC ACID: CPT

## 2019-11-13 ENCOUNTER — TELEPHONE (OUTPATIENT)
Dept: FAMILY MEDICINE | Facility: CLINIC | Age: 84
End: 2019-11-13

## 2019-11-13 NOTE — PROGRESS NOTES
Bel Oquendo was seen 11/13/2019 for her annual audiological evaluation.     Patient has a history of bilateral SNHL with use of binaural amplification.     Audiogram results reveal a moderate-to-severe sensorineural hearing loss bilaterally.  Speech Reception Thresholds were  55 dBHL for the right ear and 50 dBHL for the left ear.    Word recognition scores were good for the right ear and good for the left ear.   Tympanograms were Type A with negative pressure for the right ear and Type A for the left ear.    Audiogram results were reviewed in detail with patient and all questions were answered. Results will be reviewed by ENT at the completion of this note.     Recommend continued daily use of binaural amplification and annual audiogram to monitor hearing loss.

## 2019-11-13 NOTE — TELEPHONE ENCOUNTER
----- Message from Kayla Aguilar sent at 11/13/2019  2:32 PM CST -----  Contact: self  Type:  Patient Returning Call    Who Called:  self  Who Left Message for Patient:  Batsheva  Does the patient know what this is regarding?:  yes  Best Call Back Number:  [phone] (home)   Additional Information:  Patient states she called regarding blood work and an appointment on 01/01/19. Patient states she is confused because her appointment is on 01/20/19. Please call patient. Thanks!

## 2019-11-18 ENCOUNTER — TELEPHONE (OUTPATIENT)
Dept: FAMILY MEDICINE | Facility: CLINIC | Age: 84
End: 2019-11-18

## 2019-11-18 NOTE — TELEPHONE ENCOUNTER
----- Message from Brenda Vasques sent at 11/18/2019  1:23 PM CST -----  Contact: Patient  Type:  Patient Returning Call    Who Called:  Patient  Who Left Message for Patient:  Batsheva  Does the patient know what this is regarding?:  appointments  Best Call Back Number:  [phone] (home)    Additional Information:  na

## 2019-11-18 NOTE — TELEPHONE ENCOUNTER
Do you need patient to do labs for January appt. She cannot come prior to appt due to transportation

## 2020-01-11 ENCOUNTER — PATIENT MESSAGE (OUTPATIENT)
Dept: FAMILY MEDICINE | Facility: CLINIC | Age: 85
End: 2020-01-11

## 2020-01-11 DIAGNOSIS — N18.30 ANEMIA DUE TO STAGE 3 CHRONIC KIDNEY DISEASE: Primary | ICD-10-CM

## 2020-01-11 DIAGNOSIS — M1A.9XX0 CHRONIC GOUT WITHOUT TOPHUS, UNSPECIFIED CAUSE, UNSPECIFIED SITE: Chronic | ICD-10-CM

## 2020-01-11 DIAGNOSIS — N18.30 CKD (CHRONIC KIDNEY DISEASE) STAGE 3, GFR 30-59 ML/MIN: Chronic | ICD-10-CM

## 2020-01-11 DIAGNOSIS — D63.1 ANEMIA DUE TO STAGE 3 CHRONIC KIDNEY DISEASE: Primary | ICD-10-CM

## 2020-01-12 DIAGNOSIS — K21.9 GASTROESOPHAGEAL REFLUX DISEASE, ESOPHAGITIS PRESENCE NOT SPECIFIED: ICD-10-CM

## 2020-01-12 DIAGNOSIS — E11.21 DIABETES MELLITUS WITH NEPHROPATHY: Chronic | ICD-10-CM

## 2020-01-12 DIAGNOSIS — I10 ESSENTIAL HYPERTENSION: ICD-10-CM

## 2020-01-12 DIAGNOSIS — E78.5 HYPERLIPIDEMIA, UNSPECIFIED HYPERLIPIDEMIA TYPE: Primary | Chronic | ICD-10-CM

## 2020-01-13 NOTE — PROGRESS NOTES
Refill Authorization Note     is requesting a refill authorization.    Brief assessment and rationale for refill: APPROVE: Needs Labs   Name and strength of medication:  pravastatin (PRAVACHOL) 40 MG tablet // pantoprazole (PROTONIX) 40 MG tablet // furosemide (LASIX) 20 MG tablet  Medication-related problems identified: Requires labs    Medication Therapy Plan: NTBO(LIPID/CMP);  FOVS; GERD- lco(01/2019); approve 3 more months of each    Medication reconciliation completed: No                         Comments:   Requested Prescriptions   Pending Prescriptions Disp Refills    pravastatin (PRAVACHOL) 40 MG tablet [Pharmacy Med Name: PRAVASTATIN TABS 40MG] 90 tablet 0     Sig: TAKE 1 TABLET DAILY       Cardiovascular:  Antilipid - Statins Failed - 1/13/2020  3:48 PM        Failed - Lipid Panel completed in last 360 days     Lab Results   Component Value Date    CHOL 185 01/08/2019    HDL 38 (L) 01/08/2019    LDLCALC Invalid, Trig>400.0 01/08/2019    TRIG 403 (H) 01/08/2019             Failed - ALT is 94 or below and within 360 days     ALT   Date Value Ref Range Status   01/08/2019 9 (L) 10 - 44 U/L Final   07/17/2018 10 10 - 44 U/L Final   12/29/2016 34 10 - 44 U/L Final              Failed - AST is 54 or below and within 360 days     AST   Date Value Ref Range Status   01/08/2019 15 10 - 40 U/L Final   07/17/2018 11 10 - 40 U/L Final   12/29/2016 21 14 - 36 U/L Final              Passed - Patient is at least 18 years old        Passed - Office visit in past 12 months or future 90 days     Recent Outpatient Visits            4 months ago Chronic obstructive pulmonary disease, unspecified COPD type    [facility] Gurjit Ma Jr., MD    8 months ago Sensorineural hearing loss (SNHL) of both ears    [facility] Liya Oneill NP    8 months ago Diabetes mellitus with nephropathy    [facility] Gurjit Ma Jr., MD    1 year ago Chronic obstructive pulmonary disease,  unspecified COPD type    [facility] Gurjit Ma Jr., MD    1 year ago Bilateral impacted cerumen    [facility] SAMIRA Oneill NP          Future Appointments              In 1 week Gurjit Ma Jr., MD [facility] [place]               pantoprazole (PROTONIX) 40 MG tablet [Pharmacy Med Name: PANTOPRAZOLE SOD DR TABS 40MG] 90 tablet 0     Sig: TAKE 1 TABLET DAILY       Gastroenterology: Proton Pump Inhibitors Failed - 1/13/2020  3:48 PM        Failed - An appropriate indication is on the problem list        Passed - Patient is at least 18 years old        Passed - Osteoporosis is not on problem list        Passed - Plavix is not on active medication list        Passed - Office visit in past 6 months or future 90 days.     Recent Outpatient Visits            4 months ago Chronic obstructive pulmonary disease, unspecified COPD type    [facility] Gurjit Ma Jr., MD    8 months ago Sensorineural hearing loss (SNHL) of both ears    [facility] SAMIRA Oneill NP    8 months ago Diabetes mellitus with nephropathy    [facility] Gurjit Ma Jr., MD    1 year ago Chronic obstructive pulmonary disease, unspecified COPD type    [facility] Gurjit Ma Jr., MD    1 year ago Bilateral impacted cerumen    [facility] SAMIRA Oneill NP          Future Appointments              In 1 week Gurjit Ma Jr., MD [facility] [place]               furosemide (LASIX) 20 MG tablet [Pharmacy Med Name: FUROSEMIDE TABS 20MG] 90 tablet 0     Sig: TAKE 1 TABLET DAILY       Cardiovascular:  Diuretics - Loop Failed - 1/13/2020  3:48 PM        Failed - eGFR in normal range and within 180 days     GFR   Date Value Ref Range Status   06/10/2015 40 (L) >60 mL/min Final     Comment:     Units are mL/min/1.73 m2  ADULT REFERENCE RANGE:_> 60 mL/min/1.73m2  Estimated GFR values above 60 mL/min/1.73m2 should be  "interpreted as "above  60 mL/alize/1.73m2, not as an exact number.  The eGFR is calcuated based on the patient race entered at registration.       eGFR if non    Date Value Ref Range Status   11/05/2019 37.0 (A) >60 mL/min/1.73 m^2 Final     Comment:     Calculation used to obtain the estimated glomerular filtration  rate (eGFR) is the CKD-EPI equation.      09/07/2019 37.0 (A) >60 mL/min/1.73 m^2 Final     Comment:     Calculation used to obtain the estimated glomerular filtration  rate (eGFR) is the CKD-EPI equation.      05/07/2019 41.0 (A) >60 mL/min/1.73 m^2 Final     Comment:     Calculation used to obtain the estimated glomerular filtration  rate (eGFR) is the CKD-EPI equation.        eGFR if    Date Value Ref Range Status   11/05/2019 42.6 (A) >60 mL/min/1.73 m^2 Final   09/07/2019 42.6 (A) >60 mL/min/1.73 m^2 Final   05/07/2019 47.3 (A) >60 mL/min/1.73 m^2 Final              Passed - Patient is at least 18 years old        Passed - Last BP in normal range within 360 days     BP Readings from Last 3 Encounters:   09/12/19 106/70   05/07/19 122/64   01/08/19 112/60              Passed - Office visit in past 12 months or future 90 days     Recent Outpatient Visits            4 months ago Chronic obstructive pulmonary disease, unspecified COPD type    [facility] Gurjit Ma Jr., MD    8 months ago Sensorineural hearing loss (SNHL) of both ears    [facility]  SAMIRA Oneill NP    8 months ago [facility] mellitus with nephropathy    [facility]    1 year ago Chronic obstructive pulmonary disease, unspecified COPD type    [facility]    1 year ago Bilateral impacted cerumen    [facility]  SAMIRA Oneill NP          Future Appointments              [facility]                Passed - K in normal range and within 180 " days     Potassium   Date Value Ref Range Status   11/05/2019 4.6 3.5 - 5.1 mmol/L Final   09/07/2019 5.1 3.5 - 5.1 mmol/L Final   05/07/2019 4.3 3.5 - 5.1 mmol/L Final   06/10/2015 4.4 3.5 - 5.1 MMOL/L Final              Passed - Na in normal range and within 180 days     Sodium   Date Value Ref Range Status   11/05/2019 136 136 - 145 mmol/L Final   09/07/2019 141 136 - 145 mmol/L Final   05/07/2019 133 (L) 136 - 145 mmol/L Final   06/10/2015 139 137 - 145 MMOL/L Final              Passed - Cr is 1.4 or below and within 180 days     Creatinine   Date Value Ref Range Status   11/05/2019 1.3 0.5 - 1.4 mg/dL Final   09/07/2019 1.3 0.5 - 1.4 mg/dL Final   05/07/2019 1.2 0.5 - 1.4 mg/dL Final   06/10/2015 1.27 (H) 0.52 - 1.04 MG/DL Final              Appointments  past 12m or future 3m with PCP    Date Provider   Last Visit   9/12/2019 Gurjit Ma Jr., MD   Next Visit   1/20/2020 Gurjit Ma Jr., MD

## 2020-01-14 RX ORDER — PRAVASTATIN SODIUM 40 MG/1
TABLET ORAL
Qty: 90 TABLET | Refills: 0 | Status: SHIPPED | OUTPATIENT
Start: 2020-01-14 | End: 2020-03-21 | Stop reason: SDUPTHER

## 2020-01-14 RX ORDER — PANTOPRAZOLE SODIUM 40 MG/1
TABLET, DELAYED RELEASE ORAL
Qty: 90 TABLET | Refills: 0 | Status: SHIPPED | OUTPATIENT
Start: 2020-01-14 | End: 2020-04-15

## 2020-01-14 RX ORDER — FUROSEMIDE 20 MG/1
TABLET ORAL
Qty: 90 TABLET | Refills: 0 | Status: SHIPPED | OUTPATIENT
Start: 2020-01-14 | End: 2020-04-16

## 2020-01-14 NOTE — TELEPHONE ENCOUNTER
I have reviewed and agree with the assessment below.  Ordering lipid but not CMP as pt has a renal function panel ordered.  Thanks

## 2020-01-20 ENCOUNTER — LAB VISIT (OUTPATIENT)
Dept: LAB | Facility: HOSPITAL | Age: 85
End: 2020-01-20
Attending: EMERGENCY MEDICINE
Payer: MEDICARE

## 2020-01-20 ENCOUNTER — OFFICE VISIT (OUTPATIENT)
Dept: FAMILY MEDICINE | Facility: CLINIC | Age: 85
End: 2020-01-20
Payer: MEDICARE

## 2020-01-20 VITALS
BODY MASS INDEX: 33.53 KG/M2 | WEIGHT: 177.5 LBS | DIASTOLIC BLOOD PRESSURE: 60 MMHG | HEART RATE: 90 BPM | OXYGEN SATURATION: 96 % | TEMPERATURE: 100 F | SYSTOLIC BLOOD PRESSURE: 136 MMHG

## 2020-01-20 DIAGNOSIS — E11.21 DIABETES MELLITUS WITH NEPHROPATHY: ICD-10-CM

## 2020-01-20 DIAGNOSIS — I10 ESSENTIAL HYPERTENSION: Chronic | ICD-10-CM

## 2020-01-20 DIAGNOSIS — N18.30 CKD (CHRONIC KIDNEY DISEASE) STAGE 3, GFR 30-59 ML/MIN: Chronic | ICD-10-CM

## 2020-01-20 DIAGNOSIS — N18.30 ANEMIA DUE TO STAGE 3 CHRONIC KIDNEY DISEASE: ICD-10-CM

## 2020-01-20 DIAGNOSIS — H90.6 MIXED CONDUCTIVE AND SENSORINEURAL HEARING LOSS OF BOTH EARS: Chronic | ICD-10-CM

## 2020-01-20 DIAGNOSIS — J44.9 CHRONIC OBSTRUCTIVE PULMONARY DISEASE, UNSPECIFIED COPD TYPE: ICD-10-CM

## 2020-01-20 DIAGNOSIS — D63.1 ANEMIA DUE TO STAGE 3 CHRONIC KIDNEY DISEASE: ICD-10-CM

## 2020-01-20 DIAGNOSIS — E78.5 HYPERLIPIDEMIA, UNSPECIFIED HYPERLIPIDEMIA TYPE: Chronic | ICD-10-CM

## 2020-01-20 DIAGNOSIS — I87.2 VENOUS INSUFFICIENCY (CHRONIC) (PERIPHERAL): ICD-10-CM

## 2020-01-20 DIAGNOSIS — M1A.9XX0 CHRONIC GOUT WITHOUT TOPHUS, UNSPECIFIED CAUSE, UNSPECIFIED SITE: Chronic | ICD-10-CM

## 2020-01-20 DIAGNOSIS — N89.8 ITCHING IN THE VAGINAL AREA: Primary | ICD-10-CM

## 2020-01-20 LAB
BASOPHILS # BLD AUTO: 0.04 K/UL (ref 0–0.2)
BASOPHILS NFR BLD: 0.4 % (ref 0–1.9)
DIFFERENTIAL METHOD: ABNORMAL
EOSINOPHIL # BLD AUTO: 0.3 K/UL (ref 0–0.5)
EOSINOPHIL NFR BLD: 2.9 % (ref 0–8)
ERYTHROCYTE [DISTWIDTH] IN BLOOD BY AUTOMATED COUNT: 15.4 % (ref 11.5–14.5)
HCT VFR BLD AUTO: 33.1 % (ref 37–48.5)
HGB BLD-MCNC: 9.4 G/DL (ref 12–16)
IMM GRANULOCYTES # BLD AUTO: 0.05 K/UL (ref 0–0.04)
IMM GRANULOCYTES NFR BLD AUTO: 0.5 % (ref 0–0.5)
LYMPHOCYTES # BLD AUTO: 0.8 K/UL (ref 1–4.8)
LYMPHOCYTES NFR BLD: 7.3 % (ref 18–48)
MCH RBC QN AUTO: 28 PG (ref 27–31)
MCHC RBC AUTO-ENTMCNC: 28.4 G/DL (ref 32–36)
MCV RBC AUTO: 99 FL (ref 82–98)
MONOCYTES # BLD AUTO: 1 K/UL (ref 0.3–1)
MONOCYTES NFR BLD: 9.3 % (ref 4–15)
NEUTROPHILS # BLD AUTO: 8.8 K/UL (ref 1.8–7.7)
NEUTROPHILS NFR BLD: 79.6 % (ref 38–73)
NRBC BLD-RTO: 0 /100 WBC
PLATELET # BLD AUTO: 152 K/UL (ref 150–350)
PMV BLD AUTO: 13.3 FL (ref 9.2–12.9)
RBC # BLD AUTO: 3.36 M/UL (ref 4–5.4)
WBC # BLD AUTO: 11.01 K/UL (ref 3.9–12.7)

## 2020-01-20 PROCEDURE — 99214 PR OFFICE/OUTPT VISIT, EST, LEVL IV, 30-39 MIN: ICD-10-PCS | Mod: S$PBB,,, | Performed by: EMERGENCY MEDICINE

## 2020-01-20 PROCEDURE — 99999 PR PBB SHADOW E&M-EST. PATIENT-LVL IV: CPT | Mod: PBBFAC,,, | Performed by: EMERGENCY MEDICINE

## 2020-01-20 PROCEDURE — 99999 PR PBB SHADOW E&M-EST. PATIENT-LVL IV: ICD-10-PCS | Mod: PBBFAC,,, | Performed by: EMERGENCY MEDICINE

## 2020-01-20 PROCEDURE — 85025 COMPLETE CBC W/AUTO DIFF WBC: CPT

## 2020-01-20 PROCEDURE — 99214 OFFICE O/P EST MOD 30 MIN: CPT | Mod: PBBFAC,PO | Performed by: EMERGENCY MEDICINE

## 2020-01-20 PROCEDURE — 99214 OFFICE O/P EST MOD 30 MIN: CPT | Mod: S$PBB,,, | Performed by: EMERGENCY MEDICINE

## 2020-01-20 PROCEDURE — 80069 RENAL FUNCTION PANEL: CPT

## 2020-01-20 RX ORDER — CLOTRIMAZOLE AND BETAMETHASONE DIPROPIONATE 10; .64 MG/G; MG/G
CREAM TOPICAL 2 TIMES DAILY
Qty: 45 G | Refills: 0 | Status: SHIPPED | OUTPATIENT
Start: 2020-01-20 | End: 2020-08-17

## 2020-01-20 NOTE — PATIENT INSTRUCTIONS
Bel,    You need to make an appointment to see Dr. Mooney (optomerty)    We will send you a letter with your results.    I would like to see you in three months.    I would encourage you to get advance directives and a living will in place.    Respectfully,    Gurjit Ma MD

## 2020-01-20 NOTE — PROGRESS NOTES
Subjective:   THIS NOTE IS DONE WITH VOICE RECOGNITION        Patient ID: Bel Oquendo is a 87 y.o. female.    Chief Complaint: Follow-up (Swelling in legs)      HPI     She comes in for routine follow-up of her chronic obstructive lung disease as well as the rash that she has developed.  She would like to cover all her medical issues today.    She is feeling well.  She is accompanied by her daughter.  She does not present any new issues.  Her oxygen services are fine.  She is using walker full-time.    She does mention a rash in her groin.  This sounds like a monilial type rash.  It comes and goes.  She has some in the abdominal folds.  She has tried a couple of different times without success.  The area that is involved this is about the size of her hands.  She is not having any vaginal bleeding.  No rectal bleeding.    COPD is stable.  Using oxygen full time.  She has not had any worsening of her shortness of breath.  She has had her influenza vaccine.    She is continuing to use her allopurinol.  She has had no issues with hyperuricemia symptomatology.    Her diabetes in renal function remains stable.  She is at excellent levels.  She is not having trouble any of her medications.  She has had 0 incidence of hypoglycemia.    Lab Results   Component Value Date    HGBA1C 6.1 (H) 09/07/2019    HGBA1C 6.1 (H) 05/07/2019    HGBA1C 6.7 (H) 01/08/2019     Lab Results   Component Value Date    LDLCALC 119.4 01/20/2020    LDLCALC Invalid, Trig>400.0 01/08/2019    LDLCALC 91.4 07/17/2018     Lab Results   Component Value Date    CREATININE 1.3 01/20/2020    CREATININE 1.3 11/05/2019    CREATININE 1.3 09/07/2019     Lab Results   Component Value Date    ESTGFRAFRICA 42.6 (A) 01/20/2020    ESTGFRAFRICA 42.6 (A) 11/05/2019    ESTGFRAFRICA 42.6 (A) 09/07/2019     Here to follow up on blood pressure.  Taking current medications.  She does have some issues with her Lasix particularly if she is traveling.  I have asked her to skip  it on most days or take half a tablet.    BP Readings from Last 3 Encounters:   01/20/20 136/60   09/12/19 106/70   05/07/19 122/64     Her last echocardiogram was 5 years ago.  There was total resolution of the pericardial effusion.  Clear etiology of the pericardial effusion was never fully identified. She is not experiencing any obvious cardiac symptomatology, other than expected.    She is experiencing some venous insufficiency with mild swelling of her legs.  I recommended she get some low compression strength stockings.  Her daughter's minute help her get these probably a nonmedical setting.    Immunization History   Administered Date(s) Administered    Influenza - High Dose - PF (65 years and older) 09/24/2012, 10/21/2013, 11/06/2015, 10/06/2016, 10/01/2017, 09/28/2018, 09/12/2019    Pneumococcal Conjugate - 13 Valent 11/06/2015    Pneumococcal Polysaccharide - 23 Valent 07/21/2014    Zoster 06/28/2012     Recombinant shingles vaccine discussed.  She would be a candidate if she so chooses.    Current Outpatient Medications   Medication Sig Dispense Refill    albuterol (PROAIR HFA) 90 mcg/actuation inhaler USE 1 TO 2 INHALATIONS EVERY 6 HOURS AS NEEDED FOR WHEEZING OR SHORTNESS OF BREATH 25.5 g 3    allopurinol (ZYLOPRIM) 100 MG tablet Take 2 tablets (200 mg total) by mouth once daily. 180 tablet 3    aspirin (ECOTRIN) 81 MG EC tablet Every day      BD ULTRA-FINE II LANCETS 30 gauge Misc USE ONCE DAILY 100 each 3    CONTOUR NEXT STRIPS Strp USE ONE STRIP DAILY TO TEST BLOOD SUGAR 100 strip 2    fish oil-omega-3 fatty acids 300-1,000 mg capsule Take 2 g by mouth once daily.        fluticasone-salmeterol diskus inhaler 100-50 mcg Inhale 1 puff into the lungs 2 (two) times daily. 180 each 3    furosemide (LASIX) 20 MG tablet TAKE 1 TABLET DAILY 90 tablet 0    glipiZIDE (GLUCOTROL) 5 MG TR24 Take 1 tablet (5 mg total) by mouth daily with breakfast. 90 tablet 3    GLUCOSAM HCL/MSM/CHONDRO SCRUGGS A  (GLUCOSAMINE HCL-MSM-CHONDROITN) 400-200-333 mg Tab Every day      lisinopril (PRINIVIL,ZESTRIL) 2.5 MG tablet Take 1 tablet (2.5 mg total) by mouth once daily. 90 tablet 3    metOLazone (ZAROXOLYN) 2.5 MG tablet One or two tablets per WEEK if needed for edema. 90 tablet 1    MILK THISTLE ORAL Take 1 tablet by mouth once daily.       multivitamin (THERAGRAN) per tablet Take 1 tablet by mouth once daily.      mv-mn/iron/folic acid/herb 190 (VITAMIN D3 COMPLETE ORAL) Take by mouth.      OXYGEN-AIR DELIVERY SYSTEMS MISC by [facility] [place] [facility].(Non-Drug; Combo Route) route continuous.      pantoprazole (PROTONIX) 40 MG tablet TAKE 1 TABLET DAILY 90 tablet 0    pravastatin (PRAVACHOL) 40 MG tablet TAKE 1 TABLET DAILY 90 tablet 0    SPIRIVA WITH HANDIHALER 18 mcg inhalation capsule INHALE THE CONTENTS OF 1 CAPSULE DAILY 90 capsule 2    azelastine (ASTELIN) 137 mcg (0.1 %) nasal spray 1 spray (137 mcg total) by Nasal route 2 (two) times daily. 30 mL 12     No current facility-administered medications for this visit.          Review of Systems   Constitutional: Negative for activity change, appetite change, chills, diaphoresis, fatigue, fever and unexpected weight change.   HENT: Negative for congestion, ear pain, hearing loss, rhinorrhea, trouble swallowing and voice change.    Eyes: Negative for pain and visual disturbance.   Respiratory: Positive for shortness of breath. Negative for cough and chest tightness.    Cardiovascular: Positive for leg swelling. Negative for chest pain and palpitations.   Gastrointestinal: Negative for abdominal distention, abdominal pain and blood in stool.   Genitourinary: Negative for difficulty urinating, flank pain, frequency and urgency.   Musculoskeletal: Positive for back pain. Negative for arthralgias, joint swelling, myalgias, neck pain and neck stiffness.   Skin: Positive for rash. Negative for pallor and wound.   Neurological: Negative for dizziness, tremors, syncope, weakness and  headaches.   Hematological: Negative for adenopathy.   Psychiatric/Behavioral: Negative for dysphoric mood and sleep disturbance. The patient is not nervous/anxious.        Objective:      Physical Exam   Constitutional: She is oriented to person, place, and time. She appears well-developed and well-nourished. No distress.   She does use a walker at all times.  She is caring a portable oxygen concentrator.   HENT:   Head: Normocephalic and atraumatic.   Mouth/Throat: Oropharynx is clear and moist.   Eyes: Pupils are equal, round, and reactive to light. Conjunctivae and EOM are normal. No scleral icterus.   Neck: Normal range of motion. Neck supple. No JVD present. No thyromegaly present.   Cardiovascular: Normal rate, regular rhythm, normal heart sounds and intact distal pulses.   No murmur heard.  Pulmonary/Chest: Effort normal and breath sounds normal. She has no wheezes. She has no rales.   Abdominal: Soft. Bowel sounds are normal. She exhibits no distension. There is no tenderness.   Musculoskeletal: Normal range of motion. She exhibits no edema or tenderness.   Lymphadenopathy:     She has no cervical adenopathy.   Neurological: She is alert and oriented to person, place, and time. She has normal reflexes. No cranial nerve deficit. Coordination normal.   Skin: Skin is warm and dry. No rash noted. No erythema.   Psychiatric: She has a normal mood and affect. Her behavior is normal.   Vitals reviewed.      Assessment:       1. Itching in the vaginal area    2. Venous insufficiency (chronic) (peripheral)    3. Diabetes mellitus with nephropathy    4. Chronic obstructive pulmonary disease, unspecified COPD type    5. CKD (chronic kidney disease) stage 3, GFR 30-59 ml/min    6. Essential hypertension    7. Chronic gout without tophus, unspecified cause, unspecified site    8. Hyperlipidemia, unspecified hyperlipidemia type    9. Mixed conductive and sensorineural hearing loss of both ears        Plan:       1.  Itching in the vaginal area  New  More consistent with tinea  If rash is not improving with clotrimazole/betamethasone, systemic preparations may be necessary.    - clotrimazole-betamethasone 1-0.05% (LOTRISONE) cream; Apply topically 2 (two) times daily.  Dispense: 45 g; Refill: 0    2. Venous insufficiency (chronic) (peripheral)  Mild-to-moderate  Encouraged commercial compression stockings, 5-10 millimeters.    3. Diabetes mellitus with nephropathy  Controlled  Annual foot exam recommended  Annual retinal exam recommended.   Hemoglobin A1c at 6 month intervals  Annual lipid profile recommended    4. Chronic obstructive pulmonary disease, unspecified COPD type  Stable  Oxygen dependent  No longer smoking tobacco  Safety with oxygen reviewed    5. CKD (chronic kidney disease) stage 3, GFR 30-59 ml/min  Stable  Avoid nonsteroidal anti-inflammatory medications    6. Essential hypertension  Controlled.  Continue current medications  Continue to avoid excessive sodium  Continue home monitoring  Target blood pressure is 139/89 or less    7. Chronic gout without tophus, unspecified cause, unspecified site  Lab Results   Component Value Date    URICACID 5.7 11/05/2019     Stable  No change in medicines    8. Hyperlipidemia, unspecified hyperlipidemia type  Acceptable control  Continue current medications  While continue focus on her cholesterol is desirable, a focus on quality of life is important.    9. Mixed conductive and sensorineural hearing loss of both ears  Remains problematic, but improved with hearing aids  The importance of remaining engaged with conversation was discussed with her.  Long-term implications of isolation reviewed.    With her daughter, we did discuss advanced directives and living will.  I have encouraged her to call if I this and get a copy in her chart.      Prefers letter

## 2020-01-21 LAB
ALBUMIN SERPL BCP-MCNC: 3.1 G/DL (ref 3.5–5.2)
ANION GAP SERPL CALC-SCNC: 8 MMOL/L (ref 8–16)
BUN SERPL-MCNC: 31 MG/DL (ref 8–23)
CALCIUM SERPL-MCNC: 10 MG/DL (ref 8.7–10.5)
CHLORIDE SERPL-SCNC: 90 MMOL/L (ref 95–110)
CO2 SERPL-SCNC: 40 MMOL/L (ref 23–29)
CREAT SERPL-MCNC: 1.3 MG/DL (ref 0.5–1.4)
EST. GFR  (AFRICAN AMERICAN): 42.6 ML/MIN/1.73 M^2
EST. GFR  (NON AFRICAN AMERICAN): 37 ML/MIN/1.73 M^2
GLUCOSE SERPL-MCNC: 91 MG/DL (ref 70–110)
PHOSPHATE SERPL-MCNC: 3.2 MG/DL (ref 2.7–4.5)
POTASSIUM SERPL-SCNC: 4.6 MMOL/L (ref 3.5–5.1)
SODIUM SERPL-SCNC: 138 MMOL/L (ref 136–145)

## 2020-02-03 ENCOUNTER — TELEPHONE (OUTPATIENT)
Dept: FAMILY MEDICINE | Facility: CLINIC | Age: 85
End: 2020-02-03

## 2020-02-04 ENCOUNTER — TELEPHONE (OUTPATIENT)
Dept: FAMILY MEDICINE | Facility: CLINIC | Age: 85
End: 2020-02-04

## 2020-02-04 NOTE — TELEPHONE ENCOUNTER
----- Message from Timbo KATLYN Frisard sent at 2/4/2020  9:32 AM CST -----  Contact: same  Patient called in and stated she missed a call from office yesterday 2/3/2020 but not sure why anyone was calling.  Patient would like a call back at [phone]

## 2020-02-10 DIAGNOSIS — E11.21 DIABETES MELLITUS WITH NEPHROPATHY: Chronic | ICD-10-CM

## 2020-02-12 RX ORDER — GLIPIZIDE 5 MG/1
TABLET, FILM COATED, EXTENDED RELEASE ORAL
Qty: 90 TABLET | Refills: 0 | Status: SHIPPED | OUTPATIENT
Start: 2020-02-12 | End: 2020-05-26

## 2020-02-12 NOTE — PROGRESS NOTES
Refill Authorization Note     is requesting a refill authorization.    Brief assessment and rationale for refill: APPROVE: prr                Medication reconciliation completed: No                         Comments:   Requested Prescriptions   Pending Prescriptions Disp Refills    glipiZIDE (GLUCOTROL) 5 MG TR24 [Pharmacy Med Name: GLIPIZIDE XL TABS 5MG] 90 tablet 0     Sig: TAKE 1 TABLET DAILY WITH BREAKFAST       Endocrinology:  Diabetes - Sulfonylureas Passed - 2/10/2020  1:17 AM        Passed - Patient is at least 18 years old        Passed - Office visit in past 12 months or future 90 days.     Recent Outpatient Visits            3 weeks ago Itching in the vaginal area    [facility] Gurjit Ma Jr., MD    5 months ago Chronic obstructive pulmonary disease, unspecified COPD type    [facility]    9 months ago Sensorineural hearing loss (SNHL) of both ears    [facility] Liya Oneill NP    9 months ago Diabetes mellitus with nephropathy    [facility]    1 year ago Chronic obstructive pulmonary disease, unspecified COPD type    [facility] Gurjit Ma Jr., MD                    Passed - HBA1C is 7.9 or below and within 180 days     Hemoglobin A1C   Date Value Ref Range Status   09/07/2019 6.1 (H) 4.0 - 5.6 % Final     Comment:     ADA Screening Guidelines:  5.7-6.4%  Consistent with prediabetes  >or=6.5%  Consistent with diabetes  High levels of fetal hemoglobin interfere with the HbA1C  assay. Heterozygous hemoglobin variants (HbS, HgC, etc)do  not significantly interfere with this assay.   However, presence of multiple variants may affect accuracy.     05/07/2019 6.1 (H) 4.0 - 5.6 % Final     Comment:     ADA Screening Guidelines:  5.7-6.4%  Consistent with prediabetes  >or=6.5%  Consistent with diabetes  High levels of fetal hemoglobin interfere with the HbA1C  assay.  "Heterozygous hemoglobin variants (HbS, HgC, etc)do  not significantly interfere with this assay.   However, presence of multiple variants may affect accuracy.     01/08/2019 6.7 (H) 4.0 - 5.6 % Final     Comment:     ADA Screening Guidelines:  5.7-6.4%  Consistent with prediabetes  >or=6.5%  Consistent with diabetes  High levels of fetal hemoglobin interfere with the HbA1C  assay. Heterozygous hemoglobin variants (HbS, HgC, etc)do  not significantly interfere with this assay.   However, presence of multiple variants may affect accuracy.                Passed - Cr is 1.3 or below and within 360 days     Creatinine   Date Value Ref Range Status   01/20/2020 1.3 0.5 - 1.4 mg/dL Final   11/05/2019 1.3 0.5 - 1.4 mg/dL Final   09/07/2019 1.3 0.5 - 1.4 mg/dL Final   06/10/2015 1.27 (H) 0.52 - 1.04 MG/DL Final              Passed - eGFR is 30 or above and within 360 days     GFR   Date Value Ref Range Status   06/10/2015 40 (L) >60 mL/min Final     Comment:     Units are mL/min/1.73 m2  ADULT REFERENCE RANGE:_> 60 mL/min/1.73m2  Estimated GFR values above 60 mL/min/1.73m2 should be interpreted as "above  60 mL/alize/1.73m2, not as an exact number.  The eGFR is calcuated based on the patient race entered at registration.       eGFR if non    Date Value Ref Range Status   01/20/2020 37.0 (A) >60 mL/min/1.73 m^2 Final     Comment:     Calculation used to obtain the estimated glomerular filtration  rate (eGFR) is the CKD-EPI equation.      11/05/2019 37.0 (A) >60 mL/min/1.73 m^2 Final     Comment:     Calculation used to obtain the estimated glomerular filtration  rate (eGFR) is the CKD-EPI equation.      09/07/2019 37.0 (A) >60 mL/min/1.73 m^2 Final     Comment:     Calculation used to obtain the estimated glomerular filtration  rate (eGFR) is the CKD-EPI equation.        eGFR if    Date Value Ref Range Status   01/20/2020 42.6 (A) >60 mL/min/1.73 m^2 Final   11/05/2019 42.6 (A) >60 mL/min/1.73 " m^2 Final   09/07/2019 42.6 (A) >60 mL/min/1.73 m^2 Final

## 2020-02-27 RX ORDER — LISINOPRIL 2.5 MG/1
TABLET ORAL
Qty: 90 TABLET | Refills: 4 | Status: ON HOLD | OUTPATIENT
Start: 2020-02-27 | End: 2021-05-26 | Stop reason: SDUPTHER

## 2020-03-21 DIAGNOSIS — E11.21 DIABETES MELLITUS WITH NEPHROPATHY: Chronic | ICD-10-CM

## 2020-03-21 RX ORDER — PRAVASTATIN SODIUM 40 MG/1
TABLET ORAL
Qty: 90 TABLET | Refills: 3 | Status: SHIPPED | OUTPATIENT
Start: 2020-03-21 | End: 2021-03-18

## 2020-04-12 DIAGNOSIS — K21.9 GASTROESOPHAGEAL REFLUX DISEASE, ESOPHAGITIS PRESENCE NOT SPECIFIED: ICD-10-CM

## 2020-04-13 DIAGNOSIS — I10 ESSENTIAL HYPERTENSION: ICD-10-CM

## 2020-04-16 NOTE — PROGRESS NOTES
"Refill Authorization Note     is requesting a refill authorization.    Brief assessment and rationale for refill: APPROVE: NA non-intentional     Medication-related problems identified: Non-adherence (knowledge deficit) non-intentional    Medication Therapy Plan: Per EPIC data 49% adherence; Approve 3 more months    Medication reconciliation completed: No                         Comments:  Refill Center Care Gap Closure protocols temporarily suspended.   Requested Prescriptions   Pending Prescriptions Disp Refills    furosemide (LASIX) 20 MG tablet [Pharmacy Med Name: FUROSEMIDE TABS 20MG] 90 tablet 0     Sig: TAKE 1 TABLET DAILY       Cardiovascular:  Diuretics - Loop Failed - 4/13/2020  2:11 AM        Failed - eGFR in normal range and within 180 days     GFR   Date Value Ref Range Status   06/10/2015 40 (L) >60 mL/min Final     Comment:     Units are mL/min/1.73 m2  ADULT REFERENCE RANGE:_> 60 mL/min/1.73m2  Estimated GFR values above 60 mL/min/1.73m2 should be interpreted as "above  60 mL/alize/1.73m2, not as an exact number.  The eGFR is calcuated based on the patient race entered at registration.       eGFR if non    Date Value Ref Range Status   01/20/2020 37.0 (A) >60 mL/min/1.73 m^2 Final     Comment:     Calculation used to obtain the estimated glomerular filtration  rate (eGFR) is the CKD-EPI equation.      11/05/2019 37.0 (A) >60 mL/min/1.73 m^2 Final     Comment:     Calculation used to obtain the estimated glomerular filtration  rate (eGFR) is the CKD-EPI equation.      09/07/2019 37.0 (A) >60 mL/min/1.73 m^2 Final     Comment:     Calculation used to obtain the estimated glomerular filtration  rate (eGFR) is the CKD-EPI equation.        eGFR if    Date Value Ref Range Status   01/20/2020 42.6 (A) >60 mL/min/1.73 m^2 Final   11/05/2019 42.6 (A) >60 mL/min/1.73 m^2 Final   09/07/2019 42.6 (A) >60 mL/min/1.73 m^2 Final              Passed - Patient is at least 18 " years old        Passed - Last BP in normal range within 360 days.     BP Readings from Last 3 Encounters:   01/20/20 136/60   09/12/19 106/70   05/07/19 122/64              Passed - Office visit in past 12 months or future 90 days.     Recent Outpatient Visits            2 months ago Itching in the vaginal area    [facility] Gurjit Ma Jr., MD    7 months ago Chronic obstructive pulmonary disease, unspecified COPD type    [facility] Gurjit Ma Jr., MD    11 months ago Sensorineural hearing loss (SNHL) of both ears    [facility] Liya Oneill NP    11 months ago Diabetes mellitus with nephropathy    [facility] Gurjit Ma Jr., MD    1 year ago Chronic obstructive pulmonary disease, unspecified COPD type    [facility] Gurjit Ma Jr., MD                    Passed - K in normal range and within 180 days     Potassium   Date Value Ref Range Status   01/20/2020 4.6 3.5 - 5.1 mmol/L Final   11/05/2019 4.6 3.5 - 5.1 mmol/L Final   09/07/2019 5.1 3.5 - 5.1 mmol/L Final   06/10/2015 4.4 3.5 - 5.1 MMOL/L Final              Passed - Na is between 130 and 148 and within 180 days     Sodium   Date Value Ref Range Status   01/20/2020 138 136 - 145 mmol/L Final   11/05/2019 136 136 - 145 mmol/L Final   09/07/2019 141 136 - 145 mmol/L Final   06/10/2015 139 137 - 145 MMOL/L Final              Passed - Cr is 1.3 or below and within 180 days     Creatinine   Date Value Ref Range Status   01/20/2020 1.3 0.5 - 1.4 mg/dL Final   11/05/2019 1.3 0.5 - 1.4 mg/dL Final   09/07/2019 1.3 0.5 - 1.4 mg/dL Final   06/10/2015 1.27 (H) 0.52 - 1.04 MG/DL Final               Appointments  past 12m or future 3m with PCP    Date Provider   Last Visit   1/20/2020 Gurjit Ma Jr., MD   Next Visit   Visit date not found Gurjit Ma Jr., MD   .  ED visits in past 90 days: 0       Note composed:12:39 PM 04/16/2020

## 2020-04-16 NOTE — PROGRESS NOTES
Refill Authorization Note     is requesting a refill authorization.    Brief assessment and rationale for refill: APPROVE: prr          Medication Therapy Plan: GERD lco (1/19)    Medication reconciliation completed: No                         Comments:   Refill Center Care Gap Closure protocols temporarily suspended.   Requested Prescriptions   Pending Prescriptions Disp Refills    pantoprazole (PROTONIX) 40 MG tablet [Pharmacy Med Name: PANTOPRAZOLE SOD DR TABS 40MG] 90 tablet 1     Sig: TAKE 1 TABLET DAILY       Gastroenterology: Proton Pump Inhibitors 2 Failed - 4/12/2020  7:15 AM        Failed - An appropriate indication is on the problem list        Passed - Patient is at least 18 years old        Passed - Osteoporosis is not on problem list        Passed - Office visit in past 6 months or future 90 days.     Recent Outpatient Visits            2 months ago Itching in the vaginal area    [facility] Mikhail Ma Jr., MD    7 months ago Chronic obstructive pulmonary disease, unspecified COPD type    [facility] Mikhail Ma Jr., MD    11 months ago Sensorineural hearing loss (SNHL) of both ears    [facility] Liya Oneill NP    11 months ago Diabetes mellitus with nephropathy    [facility] Mikhail Ma Jr., MD    1 year ago Chronic obstructive pulmonary disease, unspecified COPD type    [facility]                     Appointments  past 12m or future 3m with PCP    Date Provider   Last Visit   1/20/2020 Gurjit Ma Jr., MD   Next Visit    Gurjit Ma Jr., MD   .  ED visits in past 90 days: 0       Note composed:10:53 PM 04/15/2020

## 2020-04-17 RX ORDER — PANTOPRAZOLE SODIUM 40 MG/1
TABLET, DELAYED RELEASE ORAL
Qty: 90 TABLET | Refills: 1 | Status: SHIPPED | OUTPATIENT
Start: 2020-04-17 | End: 2020-10-19

## 2020-04-17 RX ORDER — FUROSEMIDE 20 MG/1
TABLET ORAL
Qty: 90 TABLET | Refills: 0 | Status: SHIPPED | OUTPATIENT
Start: 2020-04-17 | End: 2020-07-13

## 2020-05-06 ENCOUNTER — PATIENT MESSAGE (OUTPATIENT)
Dept: ADMINISTRATIVE | Facility: HOSPITAL | Age: 85
End: 2020-05-06

## 2020-05-24 DIAGNOSIS — E11.21 DIABETES MELLITUS WITH NEPHROPATHY: Chronic | ICD-10-CM

## 2020-05-26 RX ORDER — GLIPIZIDE 5 MG/1
TABLET, FILM COATED, EXTENDED RELEASE ORAL
Qty: 90 TABLET | Refills: 3 | Status: SHIPPED | OUTPATIENT
Start: 2020-05-26 | End: 2021-04-18 | Stop reason: CLARIF

## 2020-05-26 NOTE — PROGRESS NOTES
Refill Authorization Note     is requesting a refill authorization.    Brief assessment and rationale for refill: APPROVE: needs labs     Medication-related problems identified: Requires labs    Medication Therapy Plan: NTBO(A1C)    Medication reconciliation completed: No                         Comments:   Automatic Epic Protocol Generated Data:    Requested Prescriptions   Signed Prescriptions Disp Refills    glipiZIDE (GLUCOTROL) 5 MG TR24 90 tablet 3     Sig: TAKE 1 TABLET DAILY WITH BREAKFAST       Endocrinology:  Diabetes - Sulfonylureas Failed - 5/24/2020  5:04 AM        Failed - HBA1C is 7.9 or below and within 180 days     Hemoglobin A1C   Date Value Ref Range Status   09/07/2019 6.1 (H) 4.0 - 5.6 % Final     Comment:     ADA Screening Guidelines:  5.7-6.4%  Consistent with prediabetes  >or=6.5%  Consistent with diabetes  High levels of fetal hemoglobin interfere with the HbA1C  assay. Heterozygous hemoglobin variants (HbS, HgC, etc)do  not significantly interfere with this assay.   However, presence of multiple variants may affect accuracy.     05/07/2019 6.1 (H) 4.0 - 5.6 % Final     Comment:     ADA Screening Guidelines:  5.7-6.4%  Consistent with prediabetes  >or=6.5%  Consistent with diabetes  High levels of fetal hemoglobin interfere with the HbA1C  assay. Heterozygous hemoglobin variants (HbS, HgC, etc)do  not significantly interfere with this assay.   However, presence of multiple variants may affect accuracy.     01/08/2019 6.7 (H) 4.0 - 5.6 % Final     Comment:     ADA Screening Guidelines:  5.7-6.4%  Consistent with prediabetes  >or=6.5%  Consistent with diabetes  High levels of fetal hemoglobin interfere with the HbA1C  assay. Heterozygous hemoglobin variants (HbS, HgC, etc)do  not significantly interfere with this assay.   However, presence of multiple variants may affect accuracy.                Passed - Patient is at least 18 years old        Passed - Office visit in past 12 months or  "future 90 days.     Recent Outpatient Visits            4 months ago Itching in the vaginal area    [facility] Gurjit Ma Jr., MD    8 months ago Chronic obstructive pulmonary disease, unspecified COPD type    [facility] Gurjit Ma Jr., MD    1 year ago Sensorineural hearing loss (SNHL) of both ears    [facility] Liya Oneill NP    1 year ago Diabetes mellitus with nephropathy    [facility] Gurjit Ma Jr., MD    1 year ago Chronic obstructive pulmonary disease, unspecified COPD type    [facility] Gurjit Ma Jr., MD                    Passed - Cr is 1.3 or below and within 360 days     Creatinine   Date Value Ref Range Status   01/20/2020 1.3 0.5 - 1.4 mg/dL Final   11/05/2019 1.3 0.5 - 1.4 mg/dL Final   09/07/2019 1.3 0.5 - 1.4 mg/dL Final   06/10/2015 1.27 (H) 0.52 - 1.04 MG/DL Final              Passed - eGFR is 30 or above and within 360 days     GFR   Date Value Ref Range Status   06/10/2015 40 (L) >60 mL/min Final     Comment:     Units are mL/min/1.73 m2  ADULT REFERENCE RANGE:_> 60 mL/min/1.73m2  Estimated GFR values above 60 mL/min/1.73m2 should be interpreted as "above  60 mL/alize/1.73m2, not as an exact number.  The eGFR is calcuated based on the patient race entered at registration.       eGFR if non    Date Value Ref Range Status   01/20/2020 37.0 (A) >60 mL/min/1.73 m^2 Final     Comment:     Calculation used to obtain the estimated glomerular filtration  rate (eGFR) is the CKD-EPI equation.      11/05/2019 37.0 (A) >60 mL/min/1.73 m^2 Final     Comment:     Calculation used to obtain the estimated glomerular filtration  rate (eGFR) is the CKD-EPI equation.      09/07/2019 37.0 (A) >60 mL/min/1.73 m^2 Final     Comment:     Calculation used to obtain the estimated glomerular filtration  rate (eGFR) is the CKD-EPI equation.        eGFR if    Date Value Ref Range Status "   01/20/2020 42.6 (A) >60 mL/min/1.73 m^2 Final   11/05/2019 42.6 (A) >60 mL/min/1.73 m^2 Final   09/07/2019 42.6 (A) >60 mL/min/1.73 m^2 Final                 Appointments  past 12m or future 3m with PCP    Date Provider   Last Visit   1/20/2020 Gurjit Ma Jr., MD   Next Visit   Visit date not found Gurjit Ma Jr., MD   ED visits in past 90 days: 0     Note composed:12:58 PM 05/26/2020

## 2020-05-26 NOTE — PROGRESS NOTES
Provider Staff:     Please schedule patient for Labs (A1C)    Please also check with your provider if any further labs need to be added and scheduled together.    Thanks!  [facility]     Appointments  past 12m or future 3m with PCP    Date Provider   Last Visit   1/20/2020 Gurjit Ma Jr., MD   Next Visit   Visit date not found Gurjit Ma Jr., MD     Note composed:12:58 PM 05/26/2020

## 2020-05-30 ENCOUNTER — LAB VISIT (OUTPATIENT)
Dept: LAB | Facility: HOSPITAL | Age: 85
End: 2020-05-30
Attending: EMERGENCY MEDICINE
Payer: MEDICARE

## 2020-05-30 DIAGNOSIS — E11.21 DIABETES MELLITUS WITH NEPHROPATHY: Chronic | ICD-10-CM

## 2020-05-30 LAB
ESTIMATED AVG GLUCOSE: 131 MG/DL (ref 68–131)
HBA1C MFR BLD HPLC: 6.2 % (ref 4–5.6)

## 2020-05-30 PROCEDURE — 36415 COLL VENOUS BLD VENIPUNCTURE: CPT | Mod: PO

## 2020-05-30 PROCEDURE — 83036 HEMOGLOBIN GLYCOSYLATED A1C: CPT

## 2020-07-09 DIAGNOSIS — J44.9 CHRONIC OBSTRUCTIVE PULMONARY DISEASE, UNSPECIFIED COPD TYPE: ICD-10-CM

## 2020-07-09 RX ORDER — TIOTROPIUM BROMIDE 18 UG/1
CAPSULE ORAL; RESPIRATORY (INHALATION)
Qty: 90 CAPSULE | Refills: 2 | Status: SHIPPED | OUTPATIENT
Start: 2020-07-09 | End: 2021-04-05

## 2020-07-09 NOTE — PROGRESS NOTES
Refill Authorization Note    is requesting a refill authorization.    Brief assessment and rationale for refill: APPROVE: prr          Medication Therapy Plan: approve 9 more    Medication reconciliation completed: No                         Comments:      Requested Prescriptions   Signed Prescriptions Disp Refills    SPIRIVA WITH HANDIHALER 18 mcg inhalation capsule 90 capsule 2     Sig: INHALE THE CONTENTS OF 1 CAPSULE DAILY       Pulmonology:  Anticholinergic Agents Failed - 7/9/2020 11:17 AM        Failed - Patient has asthma or COPD and there is a controller medication on the active medication list        Passed - Patient is at least 18 years old        Passed - Office visit in past 12 months or future 90 days.     Recent Outpatient Visits            5 months ago Itching in the vaginal area    [facility] Mikhail Ma Jr., MD    10 months ago Chronic obstructive pulmonary disease, unspecified COPD type    [facility] Mikhail Ma Jr., MD    1 year ago Sensorineural hearing loss (SNHL) of both ears    [facility] Liya Oneill NP    1 year ago Diabetes mellitus with nephropathy    [facility] Mikhail Ma Jr., MD    1 year ago Chronic obstructive pulmonary disease, unspecified COPD type    [facility] Mikhail Ma Jr., MD                        Appointments  past 12m or future 3m with PCP    Date Provider   Last Visit   1/20/2020 Gurjit Ma Jr., MD   Next Visit   Visit date not found Gurjit Ma Jr., MD   ED visits in past 90 days: 0     Note composed:4:12 PM 07/09/2020

## 2020-07-12 DIAGNOSIS — I10 ESSENTIAL HYPERTENSION: ICD-10-CM

## 2020-07-13 RX ORDER — FUROSEMIDE 20 MG/1
TABLET ORAL
Qty: 90 TABLET | Refills: 0 | Status: ON HOLD | OUTPATIENT
Start: 2020-07-13 | End: 2020-08-19 | Stop reason: SDUPTHER

## 2020-07-13 NOTE — PROGRESS NOTES
"Refill Authorization Note    is requesting a refill authorization.    Brief assessment and rationale for refill: APPROVE: PRR               Medication reconciliation completed: No                         Comments:      Requested Prescriptions   Pending Prescriptions Disp Refills    furosemide (LASIX) 20 MG tablet [Pharmacy Med Name: FUROSEMIDE TABS 20MG] 90 tablet 0     Sig: TAKE 1 TABLET DAILY       Cardiovascular:  Diuretics - Loop Failed - 7/12/2020  4:55 AM        Failed - eGFR in normal range and within 180 days     GFR   Date Value Ref Range Status   06/10/2015 40 (L) >60 mL/min Final     Comment:     Units are mL/min/1.73 m2  ADULT REFERENCE RANGE:_> 60 mL/min/1.73m2  Estimated GFR values above 60 mL/min/1.73m2 should be interpreted as "above  60 mL/alize/1.73m2, not as an exact number.  The eGFR is calcuated based on the patient race entered at registration.       eGFR if non    Date Value Ref Range Status   01/20/2020 37.0 (A) >60 mL/min/1.73 m^2 Final     Comment:     Calculation used to obtain the estimated glomerular filtration  rate (eGFR) is the CKD-EPI equation.      11/05/2019 37.0 (A) >60 mL/min/1.73 m^2 Final     Comment:     Calculation used to obtain the estimated glomerular filtration  rate (eGFR) is the CKD-EPI equation.      09/07/2019 37.0 (A) >60 mL/min/1.73 m^2 Final     Comment:     Calculation used to obtain the estimated glomerular filtration  rate (eGFR) is the CKD-EPI equation.        eGFR if    Date Value Ref Range Status   01/20/2020 42.6 (A) >60 mL/min/1.73 m^2 Final   11/05/2019 42.6 (A) >60 mL/min/1.73 m^2 Final   09/07/2019 42.6 (A) >60 mL/min/1.73 m^2 Final              Passed - Patient is at least 18 years old        Passed - Last BP in normal range within 360 days.     BP Readings from Last 3 Encounters:   01/20/20 136/60   09/12/19 106/70   05/07/19 122/64              Passed - Office visit in past 12 months or future 90 days.     Recent " Outpatient Visits            5 months ago Itching in the vaginal area    [facility] Gurjit Ma Jr., MD    10 months ago Chronic obstructive pulmonary disease, unspecified COPD type    [facility] Gurjit Ma Jr., MD    1 year ago Sensorineural hearing loss (SNHL) of both ears    [facility] Liya Oneill NP    1 year ago Diabetes mellitus with nephropathy    [facility] Gurjit Ma Jr., MD    1 year ago Chronic obstructive pulmonary disease, unspecified COPD type    [facility] Gurjit Ma Jr., MD                    Passed - K in normal range and within 180 days     Potassium   Date Value Ref Range Status   01/20/2020 4.6 3.5 - 5.1 mmol/L Final   11/05/2019 4.6 3.5 - 5.1 mmol/L Final   09/07/2019 5.1 3.5 - 5.1 mmol/L Final   06/10/2015 4.4 3.5 - 5.1 MMOL/L Final              Passed - Na is between 130 and 148 and within 180 days     Sodium   Date Value Ref Range Status   01/20/2020 138 136 - 145 mmol/L Final   11/05/2019 136 136 - 145 mmol/L Final   09/07/2019 141 136 - 145 mmol/L Final   06/10/2015 139 137 - 145 MMOL/L Final              Passed - Cr is 1.3 or below and within 180 days     Creatinine   Date Value Ref Range Status   01/20/2020 1.3 0.5 - 1.4 mg/dL Final   11/05/2019 1.3 0.5 - 1.4 mg/dL Final   09/07/2019 1.3 0.5 - 1.4 mg/dL Final   06/10/2015 1.27 (H) 0.52 - 1.04 MG/DL Final                  Appointments  past 12m or future 3m with PCP    Date Provider   Last Visit   1/20/2020 Gurjit Ma Jr., MD   Next Visit   Visit date not found Gurjit Ma Jr., MD   ED visits in past 90 days: 0     Note composed:1:10 PM 07/13/2020

## 2020-07-15 DIAGNOSIS — Z71.89 COMPLEX CARE COORDINATION: ICD-10-CM

## 2020-07-16 DIAGNOSIS — E11.21 DIABETES MELLITUS WITH NEPHROPATHY: Chronic | ICD-10-CM

## 2020-07-16 RX ORDER — METOLAZONE 2.5 MG/1
TABLET ORAL
Qty: 24 TABLET | Refills: 0 | Status: ON HOLD | OUTPATIENT
Start: 2020-07-16 | End: 2020-08-19 | Stop reason: HOSPADM

## 2020-07-16 NOTE — TELEPHONE ENCOUNTER
Refill Authorization Note    is requesting a refill authorization.    Brief assessment and rationale for refill: APPROVE: prr               Medication reconciliation completed: No                         Comments:      Requested Prescriptions   Signed Prescriptions Disp Refills    metOLazone (ZAROXOLYN) 2.5 MG tablet 24 tablet 0     Sig: One or two tablets per WEEK if needed for edema.       Cardiovascular:  Diuretics - Thiazide - metolazone Passed - 7/16/2020  2:36 AM        Passed - Patient is at least 18 years old        Passed - Last BP in normal range within 360 days.     BP Readings from Last 3 Encounters:   01/20/20 136/60   09/12/19 106/70   05/07/19 122/64              Passed - Office visit in past 12 months or future 90 days.     Recent Outpatient Visits            5 months ago Itching in the vaginal area    [facility] Gurjit Ma Jr., MD    10 months ago Chronic obstructive pulmonary disease, unspecified COPD type    [facility] Gurjit Ma Jr., MD    1 year ago Sensorineural hearing loss (SNHL) of both ears    [facility] Liya Oneill NP    1 year ago Diabetes mellitus with nephropathy    [facility] Gurjit Ma Jr., MD    1 year ago Chronic obstructive pulmonary disease, unspecified COPD type    [facility] Gurjit Ma Jr., MD                    Passed - Na is between 130 and 148 and within 180 days     Sodium   Date Value Ref Range Status   01/20/2020 138 136 - 145 mmol/L Final   11/05/2019 136 136 - 145 mmol/L Final   09/07/2019 141 136 - 145 mmol/L Final   06/10/2015 139 137 - 145 MMOL/L Final              Passed - K in normal range and within 180 days     Potassium   Date Value Ref Range Status   01/20/2020 4.6 3.5 - 5.1 mmol/L Final   11/05/2019 4.6 3.5 - 5.1 mmol/L Final   09/07/2019 5.1 3.5 - 5.1 mmol/L Final   06/10/2015 4.4 3.5 - 5.1 MMOL/L Final              Passed - Ca in normal range and within  360 days     Calcium   Date Value Ref Range Status   01/20/2020 10.0 8.7 - 10.5 mg/dL Final   11/05/2019 9.6 8.7 - 10.5 mg/dL Final   09/07/2019 10.4 8.7 - 10.5 mg/dL Final                  Appointments  past 12m or future 3m with PCP    Date Provider   Last Visit   1/20/2020 Gurjit Ma Jr., MD   Next Visit   Visit date not found Gurjit Ma Jr., MD   ED visits in past 90 days: [unfilled]     Note composed:11:32 AM 07/16/2020

## 2020-08-17 PROBLEM — I87.2 VENOUS STASIS DERMATITIS OF BOTH LOWER EXTREMITIES: Status: ACTIVE | Noted: 2020-08-17

## 2020-08-17 PROBLEM — I50.9 CHF EXACERBATION: Status: ACTIVE | Noted: 2020-08-17

## 2020-08-17 PROBLEM — D53.9 MACROCYTIC ANEMIA: Status: ACTIVE | Noted: 2020-08-17

## 2020-08-17 PROBLEM — N30.01 ACUTE CYSTITIS WITH HEMATURIA: Status: ACTIVE | Noted: 2020-08-17

## 2020-08-17 PROBLEM — G93.41 SEPTIC ENCEPHALOPATHY: Status: ACTIVE | Noted: 2020-08-17

## 2020-08-17 PROBLEM — N39.0 URINARY TRACT INFECTION WITHOUT HEMATURIA: Status: ACTIVE | Noted: 2020-08-17

## 2020-08-19 DIAGNOSIS — M1A.9XX0 CHRONIC GOUT WITHOUT TOPHUS, UNSPECIFIED CAUSE, UNSPECIFIED SITE: Chronic | ICD-10-CM

## 2020-08-19 PROBLEM — I50.33 ACUTE ON CHRONIC DIASTOLIC HEART FAILURE: Status: ACTIVE | Noted: 2020-08-17

## 2020-08-19 NOTE — TELEPHONE ENCOUNTER
No new care gaps identified.  Powered by [employer]. Reference number: [id number]. 8/19/2020 2:35:04 AM CDT

## 2020-08-19 NOTE — PROGRESS NOTES
Refill Routing Note   Medication(s) are not appropriate for processing by [facility]:       - Patient has been seen in the Emergency Room/Department since the last PCP visit  - Patient has been hospitalized since the last PCP visit        Medication Therapy Plan: CDMR. Recent ED visit to admission for Chronic obstructive pulmonary disease,; Defer to you  Medication reconciliation completed: No      Automatic Epic Generated Protocol Data:        Requested Prescriptions   Pending Prescriptions Disp Refills    allopurinoL (ZYLOPRIM) 100 MG tablet [Pharmacy Med Name: ALLOPURINOL TABS 100MG] 180 tablet 0     Sig: TAKE 2 TABLETS ONCE DAILY       Endocrinology:  Gout Agents - allopurinol Failed - 8/19/2020  2:34 AM        Failed - WBC in normal range and within 360 days     WBC   Date Value Ref Range Status   08/19/2020 18.31 (H) 3.90 - 12.70 K/uL Final   08/18/2020 14.09 (H) 3.90 - 12.70 K/uL Final   08/17/2020 13.60 (H) 3.90 - 12.70 K/uL Final              Failed - RBC in normal range and within 360 days     RBC   Date Value Ref Range Status   08/19/2020 2.89 (L) 4.00 - 5.40 M/uL Final   08/18/2020 3.18 (L) 4.00 - 5.40 M/uL Final   08/17/2020 3.14 (L) 4.00 - 5.40 M/uL Final              Failed - HGB in normal range and within 360 days     Hemoglobin   Date Value Ref Range Status   08/19/2020 8.7 (L) 12.0 - 16.0 g/dL Final   08/18/2020 9.4 (L) 12.0 - 16.0 g/dL Final   08/17/2020 9.1 (L) 12.0 - 16.0 g/dL Final              Failed - HCT in normal range and within 360 days     Hematocrit   Date Value Ref Range Status   08/19/2020 27.3 (L) 37.0 - 48.5 % Final   08/18/2020 30.6 (L) 37.0 - 48.5 % Final   08/17/2020 32.2 (L) 37.0 - 48.5 % Final              Failed - PLT in normal range and within 360 days     Platelets   Date Value Ref Range Status   08/19/2020 135 (L) 150 - 350 K/uL Final   08/18/2020 135 (L) 150 - 350 K/uL Final   08/17/2020 131 (L) 150 - 350 K/uL Final              Failed - eGFR is 60 or above and  "within 360 days     GFR   Date Value Ref Range Status   06/10/2015 40 (L) >60 mL/min Final     Comment:     Units are mL/min/1.73 m2  ADULT REFERENCE RANGE:_> 60 mL/min/1.73m2  Estimated GFR values above 60 mL/min/1.73m2 should be interpreted as "above  60 mL/alize/1.73m2, not as an exact number.  The eGFR is calcuated based on the patient race entered at registration.       eGFR if non    Date Value Ref Range Status   08/19/2020 37 (A) >60 mL/min/1.73 m^2 Final     Comment:     Calculation used to obtain the estimated glomerular filtration  rate (eGFR) is the CKD-EPI equation.      08/18/2020 40 (A) >60 mL/min/1.73 m^2 Final     Comment:     Calculation used to obtain the estimated glomerular filtration  rate (eGFR) is the CKD-EPI equation.      08/17/2020 45 (A) >60 mL/min/1.73 m^2 Final     Comment:     Calculation used to obtain the estimated glomerular filtration  rate (eGFR) is the CKD-EPI equation.        eGFR if    Date Value Ref Range Status   08/19/2020 43 (A) >60 mL/min/1.73 m^2 Final   08/18/2020 46 (A) >60 mL/min/1.73 m^2 Final   08/17/2020 52 (A) >60 mL/min/1.73 m^2 Final              Passed - Patient is at least 18 years old        Passed - Office visit in past 12 months or future 90 days.     Recent Outpatient Visits            7 months ago Itching in the vaginal area    [facility] M[facility]    11 months ago Chronic obstructive pulmonary disease, unspecified COPD type    [facility] Gurjit Ma Jr., MD    1 year ago Sensorineural hearing loss (SNHL) of both ears    [facility] Liya Oneill NP    1 year ago Diabetes mellitus with nephropathy    [facility] Mikhail Ma Jr., MD    1 year ago Chronic obstructive pulmonary disease, unspecified COPD type    [facility] Mikhail Ma Jr., MD                    Passed - Uric Acid is 5.9 or below and within 360 days     Uric Acid "   Date Value Ref Range Status   11/05/2019 5.7 2.4 - 5.7 mg/dL Final   09/07/2019 9.0 (H) 2.4 - 5.7 mg/dL Final   07/17/2018 7.3 (H) 2.4 - 5.7 mg/dL Final              Passed - Cr is 1.3 or below and within 360 days     Creatinine   Date Value Ref Range Status   08/19/2020 1.28 0.50 - 1.40 mg/dL Final   08/18/2020 1.21 0.50 - 1.40 mg/dL Final   08/17/2020 1.10 0.50 - 1.40 mg/dL Final   06/10/2015 1.27 (H) 0.52 - 1.04 MG/DL Final              Passed - ALT is 94 or below and within 360 days     ALT   Date Value Ref Range Status   08/19/2020 7 0 - 35 U/L Final   08/18/2020 10 0 - 35 U/L Final   08/17/2020 7 0 - 35 U/L Final              Passed - AST is 54 or below and within 360 days     AST   Date Value Ref Range Status   08/19/2020 24 14 - 36 U/L Final   08/18/2020 43 (H) 14 - 36 U/L Final   08/17/2020 21 14 - 36 U/L Final                    Appointments  past 12m or future 3m with PCP    Date Provider   Last Visit   1/20/2020 Gurjit Ma Jr., MD   Next Visit   Visit date not found Gurjit Ma Jr., MD   ED visits in past 90 days: 0     Note composed:3:05 PM 08/19/2020

## 2020-08-20 ENCOUNTER — TELEPHONE (OUTPATIENT)
Dept: FAMILY MEDICINE | Facility: CLINIC | Age: 85
End: 2020-08-20

## 2020-08-20 NOTE — TELEPHONE ENCOUNTER
----- Message from Jodi Bolanos sent at 8/20/2020  9:48 AM CDT -----  Type: Needs Medical Advice  Who Called: Roman howell  Best Call Back Number: [phone]  Additional Information: the caller is asking for a call back she has some concerns with her mom being sent to a  facility

## 2020-08-20 NOTE — TELEPHONE ENCOUNTER
I spoke with pt daughter. Pt was discharged from hospital [facility], COPD  exacerbation, UTI   She is now on steroids. (doing sliding scale for her blood sugars) she will come in for hospital follow up  She will need referral to cardiologist and pulmonary MD. This is just FYI

## 2020-08-21 RX ORDER — ALLOPURINOL 100 MG/1
TABLET ORAL
Qty: 180 TABLET | Refills: 0 | Status: SHIPPED | OUTPATIENT
Start: 2020-08-21 | End: 2020-11-23 | Stop reason: SDUPTHER

## 2020-08-24 ENCOUNTER — OFFICE VISIT (OUTPATIENT)
Dept: FAMILY MEDICINE | Facility: CLINIC | Age: 85
End: 2020-08-24
Payer: MEDICARE

## 2020-08-24 VITALS
SYSTOLIC BLOOD PRESSURE: 124 MMHG | DIASTOLIC BLOOD PRESSURE: 44 MMHG | BODY MASS INDEX: 34.19 KG/M2 | WEIGHT: 186.94 LBS | HEART RATE: 88 BPM | TEMPERATURE: 99 F | OXYGEN SATURATION: 95 %

## 2020-08-24 DIAGNOSIS — E11.21 DIABETES MELLITUS WITH NEPHROPATHY: Chronic | ICD-10-CM

## 2020-08-24 DIAGNOSIS — N18.30 ANEMIA DUE TO STAGE 3 CHRONIC KIDNEY DISEASE: ICD-10-CM

## 2020-08-24 DIAGNOSIS — D63.1 ANEMIA DUE TO STAGE 3 CHRONIC KIDNEY DISEASE: ICD-10-CM

## 2020-08-24 DIAGNOSIS — N30.01 ACUTE CYSTITIS WITH HEMATURIA: Primary | ICD-10-CM

## 2020-08-24 DIAGNOSIS — J44.1 COPD WITH ACUTE EXACERBATION: ICD-10-CM

## 2020-08-24 PROCEDURE — 99999 PR PBB SHADOW E&M-EST. PATIENT-LVL V: CPT | Mod: PBBFAC,,, | Performed by: PHYSICIAN ASSISTANT

## 2020-08-24 PROCEDURE — 99999 PR PBB SHADOW E&M-EST. PATIENT-LVL V: ICD-10-PCS | Mod: PBBFAC,,, | Performed by: PHYSICIAN ASSISTANT

## 2020-08-24 PROCEDURE — 99215 OFFICE O/P EST HI 40 MIN: CPT | Mod: PBBFAC,PO | Performed by: PHYSICIAN ASSISTANT

## 2020-08-24 PROCEDURE — 99214 OFFICE O/P EST MOD 30 MIN: CPT | Mod: S$PBB,,, | Performed by: PHYSICIAN ASSISTANT

## 2020-08-24 PROCEDURE — 99214 PR OFFICE/OUTPT VISIT, EST, LEVL IV, 30-39 MIN: ICD-10-PCS | Mod: S$PBB,,, | Performed by: PHYSICIAN ASSISTANT

## 2020-08-24 NOTE — PROGRESS NOTES
Subjective:      Patient ID: Bel Oquendo is a 88 y.o. female.    Chief Complaint: Hospital Follow Up  Patient here with daughter in visit.    HPI   Patient recently went to [facility] on 8/17/2020 to 8/19/2020 for COPD exacerbation and altered mental status due to UTI.    She complains of weakness and starting PT for endurance.  She does not exercise due to dealing with oxygen cords.    Taking Lasix every Monday, Wednesday, and Friday.    Completed oral prednisone yesterday.  Using sliding scale for insulin injections currently.  Blood sugar has been under 100 in the mornings.  Lab Results   Component Value Date    HGBA1C 6.2 (H) 05/30/2020     Review of Systems   Constitutional: Negative for appetite change, chills, fatigue and fever.   Respiratory: Positive for shortness of breath (baseline).    Cardiovascular: Negative for chest pain.   Gastrointestinal: Positive for constipation. Negative for abdominal pain, blood in stool, diarrhea, nausea and vomiting.   Endocrine: Positive for polyuria.   Genitourinary: Positive for frequency. Negative for dysuria and hematuria.   Neurological: Positive for dizziness (mild) and weakness. Negative for headaches.   Psychiatric/Behavioral: Negative for confusion and sleep disturbance.       Objective:   BP (!) 124/44 (BP Location: Left arm, Patient Position: Sitting)   Pulse 88   Temp 98.9 °F (37.2 °C)   Wt 84.8 kg (186 lb 15.2 oz)   SpO2 95%   BMI 34.19 kg/m²      Physical Exam  Vitals signs reviewed.   Constitutional:       General: She is not in acute distress.     Appearance: Normal appearance. She is well-developed and well-groomed.      Comments: Patient uses a walker to ambulate.  She is on continual O2 via nasal cannula.   HENT:      Head: Normocephalic and atraumatic.      Right Ear: Hearing and external ear normal.      Left Ear: Hearing and external ear normal.   Eyes:      General: Lids are normal.      Conjunctiva/sclera: Conjunctivae normal.   Neck:       Musculoskeletal: Normal range of motion.      Trachea: Phonation normal.   Cardiovascular:      Rate and Rhythm: Normal rate and regular rhythm.      Heart sounds: Normal heart sounds. No murmur. No friction rub. No gallop.    Pulmonary:      Effort: Pulmonary effort is normal. No respiratory distress.      Breath sounds: Normal breath sounds. No decreased breath sounds, wheezing, rhonchi or rales.   Abdominal:      General: Bowel sounds are normal.      Palpations: Abdomen is soft.      Tenderness: There is no abdominal tenderness.   Musculoskeletal:      Right ankle: She exhibits swelling (1+ pitting).      Left ankle: She exhibits swelling (1+ pitting).   Skin:     General: Skin is warm and dry.      Findings: No rash.   Neurological:      General: No focal deficit present.      Mental Status: She is alert and oriented to person, place, and time.   Psychiatric:         Attention and Perception: Attention normal.         Mood and Affect: Mood normal.         Speech: Speech normal.         Behavior: Behavior normal. Behavior is cooperative.         Judgment: Judgment normal.        Assessment:      1. Acute cystitis with hematuria    2. COPD with acute exacerbation    3. Anemia due to stage 3 chronic kidney disease    4. Diabetes mellitus with nephropathy       Plan:   1. Acute cystitis with hematuria  Continue doxycycline as prescribed.    2. COPD with acute exacerbation  Controlled with oxygen and recent prednisone.    3. Anemia due to stage 3 chronic kidney disease  Take iron every other day with a small amount of orange juice.  Start 1/2 capful of miralax everyday for constipation.  - iron polysacch complex-b12-fa 150-25-1 mg-mcg-mg (FERREX FORTE) 150-25-1 mg-mcg-mg Cap; Take 1 capsule by mouth once daily.  Dispense: 90 capsule; Refill: 3    4. Diabetes mellitus with nephropathy  Patient refused today.  Will schedule in future.  Instructed to continue insulin based on sliding scale for two more days.  Limit  carbs and sugar in diet.  - Hemoglobin A1C; Future    Follow up in two months with Dr. Ma.  Patient agreed with plan and expressed understanding.    Thank you for allowing me to serve you,

## 2020-08-24 NOTE — PATIENT INSTRUCTIONS
Use Eucerin or Aquaphor daily on both lower legs.    Take iron every other day with a small amount of orange juice.  Start 1/2 capful of miralax everyday.    Continue sliding scale for 2-3 days more.    Continue doxycycline and continue drinking water.     Thanks for seeing me,  Carol Solomon PA-C

## 2020-09-02 ENCOUNTER — DOCUMENT SCAN (OUTPATIENT)
Dept: HOME HEALTH SERVICES | Facility: HOSPITAL | Age: 85
End: 2020-09-02

## 2020-09-14 ENCOUNTER — EXTERNAL HOME HEALTH (OUTPATIENT)
Dept: HOME HEALTH SERVICES | Facility: HOSPITAL | Age: 85
End: 2020-09-14

## 2020-09-18 ENCOUNTER — DOCUMENT SCAN (OUTPATIENT)
Dept: HOME HEALTH SERVICES | Facility: HOSPITAL | Age: 85
End: 2020-09-18
Payer: MEDICARE

## 2020-09-18 ENCOUNTER — DOCUMENT SCAN (OUTPATIENT)
Dept: HOME HEALTH SERVICES | Facility: HOSPITAL | Age: 85
End: 2020-09-18

## 2020-10-01 ENCOUNTER — PATIENT MESSAGE (OUTPATIENT)
Dept: OTHER | Facility: OTHER | Age: 85
End: 2020-10-01

## 2020-10-05 DIAGNOSIS — E11.40 DIABETES MELLITUS WITH NEUROPATHY: ICD-10-CM

## 2020-10-05 NOTE — TELEPHONE ENCOUNTER
Care Due:                  Date            Visit Type   Department     Provider  --------------------------------------------------------------------------------                                ESTABLISHED                              PATIENT      [facility] FAMILY  Last Visit: 01-      EXTENDED     MEDICINE       MAMIE STOKES                                           [facility]  Next Visit: 10-      [facility]         MEDICINE       MAMIE STOKES                                                            Last  Test          Frequency    Reason                     Performed    Due Date  --------------------------------------------------------------------------------    Uric Acid...  12 months..  allopurinoL..............  11-   10-    Powered by [employer]. Reference number: [id number]. 10/05/2020 11:29:58 AM   CDT

## 2020-10-06 RX ORDER — BLOOD SUGAR DIAGNOSTIC
STRIP MISCELLANEOUS
Qty: 100 STRIP | Refills: 3 | Status: ON HOLD | OUTPATIENT
Start: 2020-10-06 | End: 2021-05-31 | Stop reason: HOSPADM

## 2020-10-06 NOTE — PROGRESS NOTES
Refill Authorization Note   Bel Oquendo is requesting a refill authorization.  Brief assessment and rationale for refill: Approve     Medication Therapy Plan: CD    Medication reconciliation completed: No   Comments:   Orders Placed This Encounter    blood sugar diagnostic (CONTOUR NEXT TEST STRIPS) Strp      Requested Prescriptions   Signed Prescriptions Disp Refills    blood sugar diagnostic (CONTOUR NEXT TEST STRIPS) Strp 100 strip 3     Sig: USE ONE STRIP DAILY TO TEST BLOOD SUGAR       There is no refill protocol information for this order         Lab Results   Component Value Date    HGBA1C 6.2 (H) 09/14/2020      Appointments  past 12m or future 3m with PCP    Date Provider   Last Visit   1/20/2020 Gurjit Ma Jr., MD   Next Visit   10/26/2020 Gurjit Ma Jr., MD   ED visits in past 90 days: 0     Note composed:3:49 PM 10/06/2020

## 2020-10-07 ENCOUNTER — DOCUMENT SCAN (OUTPATIENT)
Dept: HOME HEALTH SERVICES | Facility: HOSPITAL | Age: 85
End: 2020-10-07
Payer: MEDICARE

## 2020-10-09 DIAGNOSIS — E11.21 DIABETES MELLITUS WITH NEPHROPATHY: Primary | Chronic | ICD-10-CM

## 2020-10-09 NOTE — TELEPHONE ENCOUNTER
No new care gaps identified.  Powered by [employer]. Reference number: [id number]. 10/09/2020 4:08:26 PM   CDT

## 2020-10-10 RX ORDER — LANCETS
EACH MISCELLANEOUS
Qty: 500 EACH | Refills: 4 | Status: ON HOLD | OUTPATIENT
Start: 2020-10-10 | End: 2021-05-31 | Stop reason: HOSPADM

## 2020-10-10 RX ORDER — INSULIN PUMP SYRINGE, 3 ML
EACH MISCELLANEOUS
Qty: 1 EACH | Refills: 0 | Status: ON HOLD | OUTPATIENT
Start: 2020-10-10 | End: 2021-05-31 | Stop reason: HOSPADM

## 2020-10-18 DIAGNOSIS — K21.9 GASTROESOPHAGEAL REFLUX DISEASE: ICD-10-CM

## 2020-10-18 NOTE — TELEPHONE ENCOUNTER
Care Due:                  Date            Visit Type   Department     Provider  --------------------------------------------------------------------------------                                ESTABLISHED                              PATIENT      [facility] FAMILY  Last Visit: 01-      EXTENDED     MEDICINE       MAMIE STOKES                                           [facility]  Next Visit: 10-      [facility]       MAMIE STOKES                                                            Last  Test          Frequency    Reason                     Performed    Due Date  --------------------------------------------------------------------------------    HDL.........  12 months..  pravastatin..............  01-   01-    LDL.........  12 months..  pravastatin..............  01-   01-    Total         12 months..  pravastatin..............  01-   01-  Cholesterol.    Triglyceride  12 months..  pravastatin..............  01-   01-  s...........    Powered by [employer]. Reference number: [id number]. 10/18/2020 9:19:14 AM   CDT

## 2020-10-19 RX ORDER — PANTOPRAZOLE SODIUM 40 MG/1
TABLET, DELAYED RELEASE ORAL
Qty: 90 TABLET | Refills: 0 | Status: SHIPPED | OUTPATIENT
Start: 2020-10-19 | End: 2021-01-19

## 2020-10-19 NOTE — PROGRESS NOTES
Refill Authorization Note   Bel Oquendo is requesting a refill authorization.  Brief assessment and rationale for refill: Approve     Medication Therapy Plan: CDMR; Protonix linked to dx of GERD; Approve 3 more; Defer labs to PCP at upcoming OV    Medication reconciliation completed: No   Comments:   Orders Placed This Encounter    pantoprazole (PROTONIX) 40 MG tablet      Requested Prescriptions   Signed Prescriptions Disp Refills    pantoprazole (PROTONIX) 40 MG tablet 90 tablet 0     Sig: TAKE 1 TABLET DAILY       Gastroenterology: Proton Pump Inhibitors 2 Failed - 10/18/2020  9:18 AM        Failed - An appropriate indication is on the problem list        Passed - Patient is at least 18 years old        Passed - Osteoporosis is not on problem list        Passed - Office Visit within last 12 months or future 90 days.     Recent Outpatient Visits            1 month ago Acute cystitis with hematuria    [facility] Carol Solomon PA-C    9 months ago Itching in the vaginal area    [facility] Gurjit Ma Jr., MD    1 year ago Chronic obstructive pulmonary disease, unspecified COPD type    [facility] Gurjit Ma Jr., MD    1 year ago Sensorineural hearing loss (SNHL) of both ears    [facility] Liya Oneill NP    1 year ago Diabetes mellitus with nephropathy    [facility] Gurjit Ma Jr., MD          Future Appointments              In 1 week Gurjit Ma Jr., MD [facility] [place]                    Appointments  past 12m or future 3m with PCP    Date Provider   Last Visit   1/20/2020 Gurjit Ma Jr., MD   Next Visit   10/26/2020 Gurjit Ma Jr., MD   ED visits in past 90 days: 0     Note composed:12:54 PM 10/19/2020

## 2020-10-26 ENCOUNTER — OFFICE VISIT (OUTPATIENT)
Dept: FAMILY MEDICINE | Facility: CLINIC | Age: 85
End: 2020-10-26
Payer: MEDICARE

## 2020-10-26 VITALS
OXYGEN SATURATION: 97 % | DIASTOLIC BLOOD PRESSURE: 80 MMHG | RESPIRATION RATE: 14 BRPM | HEART RATE: 92 BPM | WEIGHT: 180.31 LBS | BODY MASS INDEX: 32.98 KG/M2 | TEMPERATURE: 98 F | SYSTOLIC BLOOD PRESSURE: 118 MMHG

## 2020-10-26 DIAGNOSIS — E61.1 IRON DEFICIENCY: ICD-10-CM

## 2020-10-26 DIAGNOSIS — I87.2 VENOUS INSUFFICIENCY (CHRONIC) (PERIPHERAL): ICD-10-CM

## 2020-10-26 DIAGNOSIS — J44.1 COPD WITH ACUTE EXACERBATION: ICD-10-CM

## 2020-10-26 DIAGNOSIS — N18.32 STAGE 3B CHRONIC KIDNEY DISEASE: Chronic | ICD-10-CM

## 2020-10-26 DIAGNOSIS — E11.21 DIABETES MELLITUS WITH NEPHROPATHY: Primary | Chronic | ICD-10-CM

## 2020-10-26 DIAGNOSIS — H90.6 MIXED CONDUCTIVE AND SENSORINEURAL HEARING LOSS OF BOTH EARS: Chronic | ICD-10-CM

## 2020-10-26 DIAGNOSIS — I10 ESSENTIAL HYPERTENSION: Chronic | ICD-10-CM

## 2020-10-26 DIAGNOSIS — M1A.9XX0 CHRONIC GOUT WITHOUT TOPHUS, UNSPECIFIED CAUSE, UNSPECIFIED SITE: Chronic | ICD-10-CM

## 2020-10-26 PROBLEM — I50.33 ACUTE ON CHRONIC DIASTOLIC HEART FAILURE: Status: RESOLVED | Noted: 2020-08-17 | Resolved: 2020-10-26

## 2020-10-26 PROBLEM — G93.41 SEPTIC ENCEPHALOPATHY: Status: RESOLVED | Noted: 2020-08-17 | Resolved: 2020-10-26

## 2020-10-26 PROBLEM — N30.01 ACUTE CYSTITIS WITH HEMATURIA: Status: RESOLVED | Noted: 2020-08-17 | Resolved: 2020-10-26

## 2020-10-26 PROCEDURE — 99999 PR PBB SHADOW E&M-EST. PATIENT-LVL III: CPT | Mod: PBBFAC,,, | Performed by: EMERGENCY MEDICINE

## 2020-10-26 PROCEDURE — 99999 PR PBB SHADOW E&M-EST. PATIENT-LVL III: ICD-10-PCS | Mod: PBBFAC,,, | Performed by: EMERGENCY MEDICINE

## 2020-10-26 PROCEDURE — 99214 PR OFFICE/OUTPT VISIT, EST, LEVL IV, 30-39 MIN: ICD-10-PCS | Mod: S$PBB,,, | Performed by: EMERGENCY MEDICINE

## 2020-10-26 PROCEDURE — 99214 OFFICE O/P EST MOD 30 MIN: CPT | Mod: S$PBB,,, | Performed by: EMERGENCY MEDICINE

## 2020-10-26 PROCEDURE — 99213 OFFICE O/P EST LOW 20 MIN: CPT | Mod: PBBFAC,PO | Performed by: EMERGENCY MEDICINE

## 2020-10-26 NOTE — PROGRESS NOTES
Subjective:   THIS NOTE IS DONE WITH VOICE RECOGNITION        Patient ID: Bel Oquendo is a 88 y.o. female.    Chief Complaint: COPD, Chronic Kidney Disease, and Anemia      HPI  She is in today to follow-up on a number of issues.  She has at home, with home health support.    Following her admission for metabolic encephalopathy back in August, she has done reasonably well.  The reason for the metabolic encephalopathy was never clearly explained, my opinion.  Her urine culture was mixed without predominant organisms.  In any event she has not had recurrence of this encephalopathy symptomatology.     She does have diabetes with nephropathy.  Her diabetes has been well controlled.  She currently is using Glucotrol at 5 milligrams a day.  I do worry about hypoglycemia and have discussed this with her daughter.  Will discontinue the Glucotrol and recheck her hemoglobin A1c 3 months.    Lab Results   Component Value Date    HGBA1C 6.2 (H) 09/14/2020    HGBA1C 6.2 (H) 05/30/2020    HGBA1C 6.1 (H) 09/07/2019     Lab Results   Component Value Date    LDLCALC 119.4 01/20/2020    LDLCALC Invalid, Trig>400.0 01/08/2019    LDLCALC 91.4 07/17/2018     Lab Results   Component Value Date    CREATININE 1.28 08/19/2020    CREATININE 1.21 08/18/2020    CREATININE 1.10 08/17/2020     Lab Results   Component Value Date    ESTGFRAFRICA 43 (A) 08/19/2020    ESTGFRAFRICA 46 (A) 08/18/2020    ESTGFRAFRICA 52 (A) 08/17/2020     She does have significant anemia.  She has been iron deficient.  She has been using her iron consistently for the last few weeks.  I have encouraged her to continue this medication, and repeat her CBC and iron studies in 3 months.  There is a possibility that her chronic renal failure is influencing her hemoglobin hematocrit status.    Her chronic obstructive lung disease, which is oxygen dependent, is doing well.  A single-view her chest done at the time of her hospitalization did reveal some pleural effusions.  With  her next visit, we will obtain another chest x-ray.    She does have longstanding venous insufficiency.  She has had this for over 40 years.  No new skin lesions or breakdown are noted.  Skin is dry.    She was recently scanned out of several 1000 dollars.  She is aware of what happened, and is working with the police to try to get her money back.  Financial security issues reviewed with her.  I have encouraged her to involve her family in any future significant exchanged of money.    Immunization History   Administered Date(s) Administered    Influenza - High Dose - PF (65 years and older) 09/24/2012, 10/21/2013, 11/06/2015, 10/06/2016, 10/01/2017, 09/28/2018, 09/12/2019, 10/10/2020    Pneumococcal Conjugate - 13 Valent 11/06/2015    Pneumococcal Polysaccharide - 23 Valent 07/21/2014    Zoster 06/28/2012     Shingrix (recombinant shingles vaccine) has been discussed and recommended.    Current Outpatient Medications   Medication Sig Dispense Refill    albuterol (PROAIR HFA) 90 mcg/actuation inhaler USE 1 TO 2 INHALATIONS EVERY 6 HOURS AS NEEDED FOR WHEEZING OR SHORTNESS OF BREATH (Patient taking differently: Inhale 2 puffs into the lungs every 6 (six) hours as needed for Shortness of Breath. USE 1 TO 2 INHALATIONS EVERY 6 HOURS AS NEEDED FOR WHEEZING OR SHORTNESS OF BREATH) 25.5 g 3    allopurinoL (ZYLOPRIM) 100 MG tablet TAKE 2 TABLETS ONCE DAILY 180 tablet 0    aspirin (ECOTRIN) 81 MG EC tablet Take 81 mg by mouth once daily.       cholecalciferol, vitamin D3, 125 mcg (5,000 unit) Tab Take 5,000 Units by mouth once daily.      fluticasone-salmeterol diskus inhaler 100-50 mcg Inhale 1 puff into the lungs 2 (two) times daily. 60 each 3    furosemide (LASIX) 20 MG tablet Take 1 tablet (20 mg total) by mouth every Mon, Wed, Fri. 30 tablet 0    glipiZIDE (GLUCOTROL) 5 MG TR24 TAKE 1 TABLET DAILY WITH BREAKFAST (Patient taking differently: Take 5 mg by mouth daily with breakfast. ) 90 tablet 3    iron  polysacch complex-b12-fa 150-25-1 mg-mcg-mg (FERREX FORTE) 150-25-1 mg-mcg-mg Cap Take 1 capsule by mouth once daily. 90 capsule 3    KRILL OIL ORAL Take 1,000 mg by mouth every morning.      lancets Mis To check BG 5  times daily, to use with insurance preferred meter pt on inuslin and sliding scale 500 each 4    lisinopril (PRINIVIL,ZESTRIL) 2.5 MG tablet TAKE 1 TABLET DAILY (Patient taking differently: Take 2.5 mg by mouth every evening. ) 90 tablet 4    melatonin (MELATIN) 3 mg tablet Take 2 tablets (6 mg total) by mouth nightly as needed for Insomnia.  0    multivitamin (THERAGRAN) per tablet Take 1 tablet by mouth once daily.      OXYGEN-AIR DELIVERY SYSTEMS MISC by [facility].(Non-Drug; Combo Route) route continuous.      pantoprazole (PROTONIX) 40 MG tablet TAKE 1 TABLET DAILY 90 tablet 0    pravastatin (PRAVACHOL) 40 MG tablet TAKE 1 TABLET DAILY (Patient taking differently: Take 40 mg by mouth once daily. ) 90 tablet 3    SPIRIVA WITH HANDIHALER 18 mcg inhalation capsule INHALE THE CONTENTS OF 1 CAPSULE DAILY (Patient taking differently: Inhale 36 mcg into the lungs once daily. ) 90 capsule 2    acetaminophen (TYLENOL) 325 MG tablet Take 2 tablets (650 mg total) by mouth every 8 (eight) hours as needed for Pain. (Patient not taking: Reported on 10/26/2020) 30 tablet 0    BD ULTRA-FINE II LANCETS 30 gauge Misc USE ONCE DAILY 100 each 3    blood sugar diagnostic (CONTOUR NEXT TEST STRIPS) Strp USE ONE STRIP DAILY TO TEST BLOOD SUGAR 100 strip 3    blood sugar diagnostic Strp To check BG 5 times daily, to use with insurance preferred meter pt is on insulin and sliding scale  E11.21 500 strip 5    blood-glucose meter kit check glucose 5  times daily, with insurance preferred meter pt on insulin and sliding scale 1 each 0    insulin aspart U-100 (NOVOLOG) 100 unit/mL (3 mL) InPn pen Inject 1-10 Units into the skin as needed (Hyperglycemia). Blood Glucose  (mg/dL)    Pre-meal   Bedtime  151-200       2  units       1 unit  201-250       4 units       2 units  251-300       6 units       3 units  301-350       8 units       4 units  >350          10 units       5 units (Patient not taking: Reported on 10/26/2020) 7 Syringe 0    L.acidophil,parac-S.therm-Bif. (RISAQUAD) Cap capsule Take 1 capsule by mouth once daily. (Patient not taking: Reported on 10/26/2020)       No current facility-administered medications for this visit.          Review of Systems   Constitutional: Positive for fatigue. Negative for activity change, appetite change, fever and unexpected weight change.   HENT: Positive for hearing loss.    Respiratory: Positive for shortness of breath.    Cardiovascular: Positive for leg swelling.   Gastrointestinal: Positive for constipation (Minimal).   Endocrine: Negative for polydipsia and polyuria.   Genitourinary: Negative.    Musculoskeletal: Positive for arthralgias and back pain.   Skin:        Chronic changes on lower legs   Neurological: Negative.    Hematological: Bruises/bleeds easily.   Psychiatric/Behavioral: Negative for dysphoric mood. The patient is not nervous/anxious.        Objective:      Physical Exam  Vitals signs reviewed.   Constitutional:       General: She is not in acute distress.     Appearance: Normal appearance. She is well-developed. She is obese. She is not ill-appearing or toxic-appearing.      Comments: She is using continuous flow oxygen.  She is examined in a sitting position.  She is alert, oriented, and very pleasant.   HENT:      Head: Normocephalic and atraumatic.      Nose: Nose normal.   Eyes:      General: No scleral icterus.     Conjunctiva/sclera: Conjunctivae normal.      Pupils: Pupils are equal, round, and reactive to light.   Neck:      Musculoskeletal: Normal range of motion and neck supple.      Thyroid: No thyromegaly.      Vascular: No JVD.   Cardiovascular:      Rate and Rhythm: Normal rate and regular rhythm.      Pulses:           Dorsalis pedis pulses  are 2+ on the right side and 2+ on the left side.        Posterior tibial pulses are 2+ on the right side and 2+ on the left side.      Heart sounds: Normal heart sounds. No murmur.   Pulmonary:      Effort: Pulmonary effort is normal.      Breath sounds: Normal breath sounds. No wheezing or rales.   Abdominal:      General: Bowel sounds are normal. There is no distension.      Palpations: Abdomen is soft.      Tenderness: There is no abdominal tenderness.   Musculoskeletal: Normal range of motion.         General: No tenderness.      Right foot: No deformity.      Left foot: No deformity.   Feet:      Right foot:      Protective Sensation: 5 sites tested. 5 sites sensed.      Skin integrity: No skin breakdown.      Toenail Condition: Right toenails are normal.      Left foot:      Protective Sensation: 5 sites tested. 5 sites sensed.      Skin integrity: No skin breakdown.      Toenail Condition: Left toenails are normal.      Comments: Chronic venous insufficient.  Lymphadenopathy:      Cervical: No cervical adenopathy.   Skin:     General: Skin is warm and dry.      Capillary Refill: Capillary refill takes less than 2 seconds.      Findings: No erythema or rash.   Neurological:      Mental Status: She is alert and oriented to person, place, and time.      Cranial Nerves: No cranial nerve deficit.      Coordination: Coordination normal.      Deep Tendon Reflexes: Reflexes are normal and symmetric.   Psychiatric:         Mood and Affect: Mood normal.         Behavior: Behavior normal.         Thought Content: Thought content normal.         Judgment: Judgment normal.         Assessment:       1. Diabetes mellitus with nephropathy    2. Iron deficiency    3. Stage 3b chronic kidney disease    4. Essential hypertension    5. COPD with acute exacerbation    6. Chronic gout without tophus, unspecified cause, unspecified site    7. 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 Diabetes mellitus with nephropathy  Controlled  Secondary to age, and risk of hypoglycemia, will discontinue Glucotrol  Repeat hemoglobin A1c in 3 months.  If there is a sudden increase in polyuria polydipsia accessory, she will let me know.    - Hemoglobin A1C; Future    2. Iron deficiency  Continue iron  Check TIBC in 3 months along with CBC.  - Iron and TIBC; Future    3. Stage 3b chronic kidney disease  Stable  Continue to monitor  Continue to avoid nonsteroidal anti-inflammatory drugs  - CBC auto differential; Future    4. Essential hypertension  Controlled.  Continue current medications  Continue to avoid excessive sodium  Continue home monitoring  Target blood pressure is 139/89 or less    5. COPD with acute exacerbation  She does not an acute exacerbation at this time, but she does have chronic obstructive lung disease.  Continue current medications  Continue to follow-up pulmonary    6. Chronic gout without tophus, unspecified cause, unspecified site  Controlled  Continue allopurinol    7. Venous insufficiency (chronic) (peripheral)  Unchanged  Continue adequate skin hydration with something like Aquaphor or Vaseline  Avoid direct trauma    8. Mixed conductive and sensorineural hearing loss of both ears  Continue wearing hearing aids  Importance of being able to continue verbal communication reviewed with her.

## 2020-10-27 NOTE — PATIENT INSTRUCTIONS
Bel.    As we discussed, please stop taking the glipizide (Glucotrol).  I am worried about you becoming hypoglycemic (low blood sugar).  This is more of a problem as you continued age.  I would like to avoid this.    If you have more troubles with increasing thirst, significant increase in your blood sugar, her you do not feel like, please let me know.    I would recommend getting Shingrix vaccine.  This is for shingles, something all of this want to avoid.    Your blood pressure is fine today.    Please continue your oxygen.    We will continue to monitor your anemia and your iron level as well as your complete blood count.  Will do this in 3 months.    Respectfully,    Gurjit Ma MD

## 2020-11-05 ENCOUNTER — EXTERNAL HOME HEALTH (OUTPATIENT)
Dept: HOME HEALTH SERVICES | Facility: HOSPITAL | Age: 85
End: 2020-11-05

## 2020-11-11 ENCOUNTER — DOCUMENT SCAN (OUTPATIENT)
Dept: HOME HEALTH SERVICES | Facility: HOSPITAL | Age: 85
End: 2020-11-11
Payer: MEDICARE

## 2020-11-13 ENCOUNTER — DOCUMENT SCAN (OUTPATIENT)
Dept: HOME HEALTH SERVICES | Facility: HOSPITAL | Age: 85
End: 2020-11-13
Payer: MEDICARE

## 2020-11-22 DIAGNOSIS — E78.5 HYPERLIPIDEMIA, UNSPECIFIED HYPERLIPIDEMIA TYPE: Primary | Chronic | ICD-10-CM

## 2020-11-22 DIAGNOSIS — M1A.9XX0 CHRONIC GOUT WITHOUT TOPHUS, UNSPECIFIED CAUSE, UNSPECIFIED SITE: Chronic | ICD-10-CM

## 2020-11-22 NOTE — TELEPHONE ENCOUNTER
Care Due:                  Date            Visit Type   Department     Provider  --------------------------------------------------------------------------------                                             [facility] FAM[facility]  Last Visit: 10-      None         MEDICINE       MAMIE STOKES  Next Visit: None Scheduled  None         None Found                                                            Last  Test          Frequency    Reason                     Performed    Due Date  --------------------------------------------------------------------------------    Lipid Panel.  12 months..  pravastatin..............  01-   01-    Powered by [employer]. Reference number: [id number]. 11/22/2020 7:42:45 AM   CST

## 2020-11-23 RX ORDER — ALLOPURINOL 100 MG/1
TABLET ORAL
Qty: 180 TABLET | Refills: 0 | Status: SHIPPED | OUTPATIENT
Start: 2020-11-23 | End: 2021-03-16

## 2020-11-23 NOTE — PROGRESS NOTES
"Refill Routing Note   Medication(s) are not appropriate for processing by [facility] for the following reason(s):     - Required laboratory values are abnormal    [facility] actions taken in this encounter: Defer    Medication-related problems identified: Requires labs  Medication Therapy Plan: CDMR; Labs (Uric acid, Lipid); Pt's H/H low; Defer to you  Medication reconciliation completed: No   Automatic Epic Generated Protocol Data:    Orders Placed This Encounter    Lipid Panel    Uric Acid      Requested Prescriptions   Pending Prescriptions Disp Refills    allopurinoL (ZYLOPRIM) 100 MG tablet [Pharmacy Med Name: ALLOPURINOL TABS 100MG] 180 tablet 0     Sig: TAKE 2 TABLETS ONCE DAILY       Endocrinology:  Gout Agents - allopurinol Failed - 11/22/2020  7:42 AM        Failed - Uric Acid within 360 days     Uric Acid   Date Value Ref Range Status   11/05/2019 5.7 2.4 - 5.7 mg/dL Final   09/07/2019 9.0 (H) 2.4 - 5.7 mg/dL Final   07/17/2018 7.3 (H) 2.4 - 5.7 mg/dL Final              Failed - eGFR is 60 or above and within 360 days     GFR   Date Value Ref Range Status   06/10/2015 40 (L) >60 mL/min Final     Comment:     Units are mL/min/1.73 m2  ADULT REFERENCE RANGE:_> 60 mL/min/1.73m2  Estimated GFR values above 60 mL/min/1.73m2 should be interpreted as "above  60 mL/alize/1.73m2, not as an exact number.  The eGFR is calcuated based on the patient race entered at registration.       eGFR if non    Date Value Ref Range Status   08/19/2020 37 (A) >60 mL/min/1.73 m^2 Final     Comment:     Calculation used to obtain the estimated glomerular filtration  rate (eGFR) is the CKD-EPI equation.      08/18/2020 40 (A) >60 mL/min/1.73 m^2 Final     Comment:     Calculation used to obtain the estimated glomerular filtration  rate (eGFR) is the CKD-EPI equation.      08/17/2020 45 (A) >60 mL/min/1.73 m^2 Final     Comment:     Calculation used to obtain the estimated glomerular filtration  rate (eGFR) is " the CKD-EPI equation.        eGFR if    Date Value Ref Range Status   08/19/2020 43 (A) >60 mL/min/1.73 m^2 Final   08/18/2020 46 (A) >60 mL/min/1.73 m^2 Final   08/17/2020 52 (A) >60 mL/min/1.73 m^2 Final              Passed - Patient is at least 18 years old        Passed - Office visit in past 12 months or future 90 days     Recent Outpatient Visits            4 weeks ago Diabetes mellitus with nephropathy    [facility] Gurjit Ma Jr., MD    3 months ago Acute cystitis with hematuria    [facility] Carol Solomon PA-C    10 months ago Itching in the vaginal area    [facility] Gurjit Ma Jr., MD    1 year ago Chronic obstructive pulmonary disease, unspecified COPD type    [facility] Gurjit Ma Jr., MD    1 year ago Sensorineural hearing loss (SNHL) of both ears    [facility] Liya Oneill NP                    Passed - Cr is 1.4 or below and within 360 days     Creatinine   Date Value Ref Range Status   08/19/2020 1.28 0.50 - 1.40 mg/dL Final   08/18/2020 1.21 0.50 - 1.40 mg/dL Final   08/17/2020 1.10 0.50 - 1.40 mg/dL Final   06/10/2015 1.27 (H) 0.52 - 1.04 MG/DL Final              Passed - WBC within 360 days     WBC   Date Value Ref Range Status   08/19/2020 18.31 (H) 3.90 - 12.70 K/uL Final   08/18/2020 14.09 (H) 3.90 - 12.70 K/uL Final   08/17/2020 13.60 (H) 3.90 - 12.70 K/uL Final              Passed - RBC within 360 days     RBC   Date Value Ref Range Status   08/19/2020 2.89 (L) 4.00 - 5.40 M/uL Final   08/18/2020 3.18 (L) 4.00 - 5.40 M/uL Final   08/17/2020 3.14 (L) 4.00 - 5.40 M/uL Final              Passed - HGB within 360 days     Hemoglobin   Date Value Ref Range Status   08/19/2020 8.7 (L) 12.0 - 16.0 g/dL Final   08/18/2020 9.4 (L) 12.0 - 16.0 g/dL Final   08/17/2020 9.1 (L) 12.0 - 16.0 g/dL Final                Passed - HCT within 360 days     Hematocrit   Date Value Ref Range Status    08/19/2020 27.3 (L) 37.0 - 48.5 % Final   08/18/2020 30.6 (L) 37.0 - 48.5 % Final   08/17/2020 32.2 (L) 37.0 - 48.5 % Final              Passed - PLT within 360 days     Platelets   Date Value Ref Range Status   08/19/2020 135 (L) 150 - 350 K/uL Final   08/18/2020 135 (L) 150 - 350 K/uL Final   08/17/2020 131 (L) 150 - 350 K/uL Final              Passed - ALT is 94 or below and within 360 days     ALT   Date Value Ref Range Status   08/19/2020 7 0 - 35 U/L Final   08/18/2020 10 0 - 35 U/L Final   08/17/2020 7 0 - 35 U/L Final              Passed - AST is 54 or below and within 360 days     AST   Date Value Ref Range Status   08/19/2020 24 14 - 36 U/L Final   08/18/2020 43 (H) 14 - 36 U/L Final   08/17/2020 21 14 - 36 U/L Final                    Appointments  past 12m or future 3m with PCP    Date Provider   Last Visit   10/26/2020 Gurjit Ma Jr., MD   Next Visit   Visit date not found Gurjit Ma Jr., MD   ED visits in past 90 days: 0        Note composed:5:11 PM 11/23/2020

## 2020-11-24 NOTE — TELEPHONE ENCOUNTER
Provider Staff:     Action is required for this patient.     Please schedule patient for Labs (Uric acid, Lipid)    Thanks!  [facility]     Appointments  past 12m or future 3m with PCP    Date Provider   Last Visit   10/26/2020 Gurjit Ma Jr., MD   Next Visit   Visit date not found Gurjit Ma Jr., MD     Note composed:3:26 PM 11/24/2020

## 2020-11-25 ENCOUNTER — TELEPHONE (OUTPATIENT)
Dept: FAMILY MEDICINE | Facility: CLINIC | Age: 85
End: 2020-11-25

## 2020-11-25 NOTE — TELEPHONE ENCOUNTER
Spoke to pt's daughter per pt to advise why pt needed labs done. Daughter stated she just saw Dr. Ma and her said she doesn't need labs until Jan. Advised allopurinol was refilled and our refill pool put in two labs for her, also no time frame was placed for labs so she could do it before she sees dr. ma in jan. Pt and daughter verbalized understanding.

## 2020-11-25 NOTE — TELEPHONE ENCOUNTER
Advised pt of labs ordered. Offered appt. Pt stated I need to find a ride first. Advised pt she can call back to schedule once she gets a ride but labs need to be done soon. Pt verbalized understanding.

## 2020-11-27 ENCOUNTER — TELEPHONE (OUTPATIENT)
Dept: AUDIOLOGY | Facility: CLINIC | Age: 85
End: 2020-11-27

## 2020-11-27 NOTE — TELEPHONE ENCOUNTER
----- Message from Ela Mayo MA sent at 11/27/2020 12:14 PM CST -----  Contact: [phone]    ----- Message -----  From: Aline Tolliver  Sent: 11/27/2020  11:53 AM CST  To: [facility] Staff    Type:  Needs Medical Advice    Who Called:  Pt  Reason for Call: regarding the Pt's Right Ear Hearing aid is not working  Would the patient rather a call back or a response via [facility]? Call back  Best Call Back Number: [phone]

## 2020-12-09 ENCOUNTER — TELEPHONE (OUTPATIENT)
Dept: AUDIOLOGY | Facility: CLINIC | Age: 85
End: 2020-12-09

## 2020-12-09 NOTE — TELEPHONE ENCOUNTER
Contacted patient's son to let him know her hearing aid is back from repair and can be picked up from the second floor check in desk.

## 2020-12-10 ENCOUNTER — CLINICAL SUPPORT (OUTPATIENT)
Dept: AUDIOLOGY | Facility: CLINIC | Age: 85
End: 2020-12-10

## 2020-12-10 PROCEDURE — V5014 PR HEARING AID REPAIR/MODIFYING: ICD-10-PCS | Mod: CSM,,, | Performed by: AUDIOLOGIST

## 2020-12-10 PROCEDURE — V5014 HEARING AID REPAIR/MODIFYING: HCPCS | Mod: CSM,,, | Performed by: AUDIOLOGIST

## 2020-12-10 PROCEDURE — COVTAX COVINGTON PRESCRIBED 4.25%: Mod: CSM,,, | Performed by: AUDIOLOGIST

## 2020-12-10 PROCEDURE — COVTAX COVINGTON PRESCRIBED 4.25%: ICD-10-PCS | Mod: CSM,,, | Performed by: AUDIOLOGIST

## 2020-12-11 ENCOUNTER — PATIENT MESSAGE (OUTPATIENT)
Dept: OTHER | Facility: OTHER | Age: 85
End: 2020-12-11

## 2020-12-22 ENCOUNTER — EXTERNAL HOME HEALTH (OUTPATIENT)
Dept: HOME HEALTH SERVICES | Facility: HOSPITAL | Age: 85
End: 2020-12-22

## 2020-12-26 ENCOUNTER — PATIENT MESSAGE (OUTPATIENT)
Dept: FAMILY MEDICINE | Facility: CLINIC | Age: 85
End: 2020-12-26

## 2020-12-28 ENCOUNTER — TELEPHONE (OUTPATIENT)
Dept: FAMILY MEDICINE | Facility: CLINIC | Age: 85
End: 2020-12-28

## 2020-12-30 ENCOUNTER — PATIENT MESSAGE (OUTPATIENT)
Dept: FAMILY MEDICINE | Facility: CLINIC | Age: 85
End: 2020-12-30

## 2020-12-31 ENCOUNTER — EXTERNAL CHRONIC CARE MANAGEMENT (OUTPATIENT)
Dept: PRIMARY CARE CLINIC | Facility: CLINIC | Age: 85
End: 2020-12-31
Payer: MEDICARE

## 2020-12-31 PROCEDURE — 99490 CHRNC CARE MGMT STAFF 1ST 20: CPT | Mod: PBBFAC,PO | Performed by: EMERGENCY MEDICINE

## 2020-12-31 PROCEDURE — 99490 PR CHRONIC CARE MGMT, 1ST 20 MIN: ICD-10-PCS | Mod: S$PBB,,, | Performed by: EMERGENCY MEDICINE

## 2020-12-31 PROCEDURE — 99490 CHRNC CARE MGMT STAFF 1ST 20: CPT | Mod: S$PBB,,, | Performed by: EMERGENCY MEDICINE

## 2021-01-04 ENCOUNTER — PATIENT OUTREACH (OUTPATIENT)
Dept: ADMINISTRATIVE | Facility: OTHER | Age: 86
End: 2021-01-04

## 2021-01-08 ENCOUNTER — OFFICE VISIT (OUTPATIENT)
Dept: OTOLARYNGOLOGY | Facility: CLINIC | Age: 86
End: 2021-01-08
Payer: MEDICARE

## 2021-01-08 ENCOUNTER — CLINICAL SUPPORT (OUTPATIENT)
Dept: AUDIOLOGY | Facility: CLINIC | Age: 86
End: 2021-01-08
Payer: MEDICARE

## 2021-01-08 VITALS — HEIGHT: 62 IN | WEIGHT: 180.31 LBS | BODY MASS INDEX: 33.18 KG/M2

## 2021-01-08 DIAGNOSIS — H93.293 IMPAIRED AUDITORY DISCRIMINATION, BILATERAL: ICD-10-CM

## 2021-01-08 DIAGNOSIS — H90.3 BILATERAL SENSORINEURAL HEARING LOSS: Primary | ICD-10-CM

## 2021-01-08 DIAGNOSIS — Z97.4 WEARS HEARING AID IN BOTH EARS: ICD-10-CM

## 2021-01-08 DIAGNOSIS — Z97.4 WEARS HEARING AID IN BOTH EARS: Primary | ICD-10-CM

## 2021-01-08 DIAGNOSIS — H61.23 BILATERAL IMPACTED CERUMEN: ICD-10-CM

## 2021-01-08 PROCEDURE — 99999 PR PBB SHADOW E&M-EST. PATIENT-LVL I: CPT | Mod: PBBFAC,,,

## 2021-01-08 PROCEDURE — 99211 OFF/OP EST MAY X REQ PHY/QHP: CPT | Mod: PBBFAC,25,27,PO

## 2021-01-08 PROCEDURE — 69210 PR REMOVAL IMPACTED CERUMEN REQUIRING INSTRUMENTATION, UNILATERAL: ICD-10-PCS | Mod: S$PBB,,, | Performed by: NURSE PRACTITIONER

## 2021-01-08 PROCEDURE — 99999 PR PBB SHADOW E&M-EST. PATIENT-LVL IV: ICD-10-PCS | Mod: PBBFAC,,, | Performed by: NURSE PRACTITIONER

## 2021-01-08 PROCEDURE — 69210 REMOVE IMPACTED EAR WAX UNI: CPT | Mod: 50,PBBFAC,PO | Performed by: NURSE PRACTITIONER

## 2021-01-08 PROCEDURE — 99499 UNLISTED E&M SERVICE: CPT | Mod: S$PBB,,, | Performed by: NURSE PRACTITIONER

## 2021-01-08 PROCEDURE — 92552 PURE TONE AUDIOMETRY AIR: CPT | Mod: PBBFAC,PO | Performed by: AUDIOLOGIST

## 2021-01-08 PROCEDURE — 99214 OFFICE O/P EST MOD 30 MIN: CPT | Mod: PBBFAC,PO,25 | Performed by: NURSE PRACTITIONER

## 2021-01-08 PROCEDURE — 69210 REMOVE IMPACTED EAR WAX UNI: CPT | Mod: S$PBB,,, | Performed by: NURSE PRACTITIONER

## 2021-01-08 PROCEDURE — 99999 PR PBB SHADOW E&M-EST. PATIENT-LVL I: ICD-10-PCS | Mod: PBBFAC,,,

## 2021-01-08 PROCEDURE — 99499 NO LOS: ICD-10-PCS | Mod: S$PBB,,, | Performed by: NURSE PRACTITIONER

## 2021-01-08 PROCEDURE — 92556 SPEECH AUDIOMETRY COMPLETE: CPT | Mod: PBBFAC,PO | Performed by: AUDIOLOGIST

## 2021-01-08 PROCEDURE — 99999 PR PBB SHADOW E&M-EST. PATIENT-LVL IV: CPT | Mod: PBBFAC,,, | Performed by: NURSE PRACTITIONER

## 2021-01-08 RX ORDER — A/SINGAPORE/GP1908/2015 IVR-180 (AN A/MICHIGAN/45/2015 (H1N1)PDM09-LIKE VIRUS, A/HONG KONG/4801/2014, NYMC X-263B (H3N2) (AN A/HONG KONG/4801/2014-LIKE VIRUS), AND B/BRISBANE/60/2008, WILD TYPE (A B/BRISBANE/60/2008-LIKE VIRUS) 15; 15; 15 UG/.5ML; UG/.5ML; UG/.5ML
INJECTION, SUSPENSION INTRAMUSCULAR
COMMUNITY
Start: 2020-10-10 | End: 2021-01-19 | Stop reason: ALTCHOICE

## 2021-01-16 DIAGNOSIS — K21.9 GASTROESOPHAGEAL REFLUX DISEASE: ICD-10-CM

## 2021-01-19 ENCOUNTER — OFFICE VISIT (OUTPATIENT)
Dept: FAMILY MEDICINE | Facility: CLINIC | Age: 86
End: 2021-01-19
Payer: MEDICARE

## 2021-01-19 VITALS
SYSTOLIC BLOOD PRESSURE: 132 MMHG | BODY MASS INDEX: 33.1 KG/M2 | DIASTOLIC BLOOD PRESSURE: 82 MMHG | HEART RATE: 106 BPM | OXYGEN SATURATION: 95 % | TEMPERATURE: 98 F | WEIGHT: 181 LBS

## 2021-01-19 DIAGNOSIS — N18.32 TYPE 2 DIABETES MELLITUS WITH STAGE 3B CHRONIC KIDNEY DISEASE, WITH LONG-TERM CURRENT USE OF INSULIN: ICD-10-CM

## 2021-01-19 DIAGNOSIS — D50.9 IRON DEFICIENCY ANEMIA, UNSPECIFIED IRON DEFICIENCY ANEMIA TYPE: ICD-10-CM

## 2021-01-19 DIAGNOSIS — E11.22 TYPE 2 DIABETES MELLITUS WITH STAGE 3B CHRONIC KIDNEY DISEASE, WITH LONG-TERM CURRENT USE OF INSULIN: ICD-10-CM

## 2021-01-19 DIAGNOSIS — Z99.81 DEPENDENCE ON CONTINUOUS SUPPLEMENTAL OXYGEN: ICD-10-CM

## 2021-01-19 DIAGNOSIS — Z79.4 TYPE 2 DIABETES MELLITUS WITH STAGE 3B CHRONIC KIDNEY DISEASE, WITH LONG-TERM CURRENT USE OF INSULIN: ICD-10-CM

## 2021-01-19 DIAGNOSIS — M79.605 LEFT LEG PAIN: Primary | ICD-10-CM

## 2021-01-19 PROCEDURE — 99215 OFFICE O/P EST HI 40 MIN: CPT | Mod: PBBFAC,PO | Performed by: PHYSICIAN ASSISTANT

## 2021-01-19 PROCEDURE — 99214 OFFICE O/P EST MOD 30 MIN: CPT | Mod: S$PBB,,, | Performed by: PHYSICIAN ASSISTANT

## 2021-01-19 PROCEDURE — 99999 PR PBB SHADOW E&M-EST. PATIENT-LVL V: CPT | Mod: PBBFAC,,, | Performed by: PHYSICIAN ASSISTANT

## 2021-01-19 PROCEDURE — 99999 PR PBB SHADOW E&M-EST. PATIENT-LVL V: ICD-10-PCS | Mod: PBBFAC,,, | Performed by: PHYSICIAN ASSISTANT

## 2021-01-19 PROCEDURE — 99214 PR OFFICE/OUTPT VISIT, EST, LEVL IV, 30-39 MIN: ICD-10-PCS | Mod: S$PBB,,, | Performed by: PHYSICIAN ASSISTANT

## 2021-01-19 RX ORDER — PANTOPRAZOLE SODIUM 40 MG/1
TABLET, DELAYED RELEASE ORAL
Qty: 90 TABLET | Refills: 3 | Status: SHIPPED | OUTPATIENT
Start: 2021-01-19

## 2021-01-26 ENCOUNTER — DOCUMENT SCAN (OUTPATIENT)
Dept: HOME HEALTH SERVICES | Facility: HOSPITAL | Age: 86
End: 2021-01-26
Payer: MEDICARE

## 2021-01-31 ENCOUNTER — EXTERNAL CHRONIC CARE MANAGEMENT (OUTPATIENT)
Dept: PRIMARY CARE CLINIC | Facility: CLINIC | Age: 86
End: 2021-01-31
Payer: MEDICARE

## 2021-01-31 PROCEDURE — 99490 CHRNC CARE MGMT STAFF 1ST 20: CPT | Mod: PBBFAC,PO | Performed by: EMERGENCY MEDICINE

## 2021-01-31 PROCEDURE — 99490 PR CHRONIC CARE MGMT, 1ST 20 MIN: ICD-10-PCS | Mod: S$PBB,,, | Performed by: EMERGENCY MEDICINE

## 2021-01-31 PROCEDURE — 99490 CHRNC CARE MGMT STAFF 1ST 20: CPT | Mod: S$PBB,,, | Performed by: EMERGENCY MEDICINE

## 2021-02-28 ENCOUNTER — EXTERNAL CHRONIC CARE MANAGEMENT (OUTPATIENT)
Dept: PRIMARY CARE CLINIC | Facility: CLINIC | Age: 86
End: 2021-02-28
Payer: MEDICARE

## 2021-02-28 PROCEDURE — 99490 PR CHRONIC CARE MGMT, 1ST 20 MIN: ICD-10-PCS | Mod: S$PBB,,, | Performed by: EMERGENCY MEDICINE

## 2021-02-28 PROCEDURE — 99490 CHRNC CARE MGMT STAFF 1ST 20: CPT | Mod: PBBFAC,PO | Performed by: EMERGENCY MEDICINE

## 2021-02-28 PROCEDURE — 99490 CHRNC CARE MGMT STAFF 1ST 20: CPT | Mod: S$PBB,,, | Performed by: EMERGENCY MEDICINE

## 2021-03-12 DIAGNOSIS — M1A.9XX0 CHRONIC GOUT WITHOUT TOPHUS, UNSPECIFIED CAUSE, UNSPECIFIED SITE: Chronic | ICD-10-CM

## 2021-03-16 DIAGNOSIS — E11.21 DIABETES MELLITUS WITH NEPHROPATHY: Chronic | ICD-10-CM

## 2021-03-16 RX ORDER — ALLOPURINOL 100 MG/1
TABLET ORAL
Qty: 180 TABLET | Refills: 1 | Status: ON HOLD | OUTPATIENT
Start: 2021-03-16 | End: 2021-05-31 | Stop reason: HOSPADM

## 2021-03-18 RX ORDER — PRAVASTATIN SODIUM 40 MG/1
TABLET ORAL
Qty: 90 TABLET | Refills: 1 | Status: ON HOLD | OUTPATIENT
Start: 2021-03-18 | End: 2021-05-31 | Stop reason: HOSPADM

## 2021-03-31 ENCOUNTER — EXTERNAL CHRONIC CARE MANAGEMENT (OUTPATIENT)
Dept: PRIMARY CARE CLINIC | Facility: CLINIC | Age: 86
End: 2021-03-31
Payer: MEDICARE

## 2021-03-31 PROCEDURE — 99490 CHRNC CARE MGMT STAFF 1ST 20: CPT | Mod: PBBFAC,PO | Performed by: EMERGENCY MEDICINE

## 2021-03-31 PROCEDURE — 99490 PR CHRONIC CARE MGMT, 1ST 20 MIN: ICD-10-PCS | Mod: S$PBB,,, | Performed by: EMERGENCY MEDICINE

## 2021-03-31 PROCEDURE — 99490 CHRNC CARE MGMT STAFF 1ST 20: CPT | Mod: S$PBB,,, | Performed by: EMERGENCY MEDICINE

## 2021-04-05 ENCOUNTER — PATIENT OUTREACH (OUTPATIENT)
Dept: ADMINISTRATIVE | Facility: OTHER | Age: 86
End: 2021-04-05

## 2021-04-07 ENCOUNTER — OFFICE VISIT (OUTPATIENT)
Dept: OPTOMETRY | Facility: CLINIC | Age: 86
End: 2021-04-07
Payer: MEDICARE

## 2021-04-07 DIAGNOSIS — Z96.1 PSEUDOPHAKIA OF BOTH EYES: ICD-10-CM

## 2021-04-07 DIAGNOSIS — H26.491 RIGHT POSTERIOR CAPSULAR OPACIFICATION: ICD-10-CM

## 2021-04-07 DIAGNOSIS — E11.9 TYPE 2 DIABETES MELLITUS WITHOUT RETINOPATHY: Primary | ICD-10-CM

## 2021-04-07 DIAGNOSIS — H35.3131 EARLY DRY STAGE NONEXUDATIVE AGE-RELATED MACULAR DEGENERATION OF BOTH EYES: ICD-10-CM

## 2021-04-07 PROCEDURE — 99999 PR PBB SHADOW E&M-EST. PATIENT-LVL IV: CPT | Mod: PBBFAC,,, | Performed by: OPTOMETRIST

## 2021-04-07 PROCEDURE — 92004 PR EYE EXAM, NEW PATIENT,COMPREHESV: ICD-10-PCS | Mod: S$PBB,,, | Performed by: OPTOMETRIST

## 2021-04-07 PROCEDURE — 99214 OFFICE O/P EST MOD 30 MIN: CPT | Mod: PBBFAC,PO | Performed by: OPTOMETRIST

## 2021-04-07 PROCEDURE — 92004 COMPRE OPH EXAM NEW PT 1/>: CPT | Mod: S$PBB,,, | Performed by: OPTOMETRIST

## 2021-04-07 PROCEDURE — 99999 PR PBB SHADOW E&M-EST. PATIENT-LVL IV: ICD-10-PCS | Mod: PBBFAC,,, | Performed by: OPTOMETRIST

## 2021-04-18 PROBLEM — E83.52 HYPERCALCEMIA: Status: ACTIVE | Noted: 2021-04-18

## 2021-04-18 PROBLEM — R41.82 AMS (ALTERED MENTAL STATUS): Status: ACTIVE | Noted: 2021-04-18
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
## 2021-04-18 PROBLEM — J96.22 ACUTE ON CHRONIC RESPIRATORY FAILURE WITH HYPOXIA AND HYPERCAPNIA: Status: ACTIVE | Noted: 2021-04-18

## 2021-04-18 PROBLEM — J96.21 ACUTE ON CHRONIC RESPIRATORY FAILURE WITH HYPOXIA AND HYPERCAPNIA: Status: ACTIVE | Noted: 2021-04-18

## 2021-04-18 PROBLEM — D64.9 ANEMIA: Status: ACTIVE | Noted: 2020-08-17

## 2021-04-18 PROBLEM — J90 BILATERAL PLEURAL EFFUSION: Status: ACTIVE | Noted: 2021-04-18

## 2021-04-18 PROBLEM — Z71.89 ADVANCE CARE PLANNING: Status: ACTIVE | Noted: 2021-04-18

## 2021-04-18 PROBLEM — R40.20 LOSS OF CONSCIOUSNESS: Status: ACTIVE | Noted: 2021-04-18

## 2021-04-20 PROBLEM — I50.32 CHRONIC DIASTOLIC HEART FAILURE: Status: ACTIVE | Noted: 2020-08-17

## 2021-04-20 PROBLEM — S09.90XA HEAD INJURY: Status: ACTIVE | Noted: 2021-04-20

## 2021-04-25 PROBLEM — I35.0 AORTIC STENOSIS, MODERATE: Chronic | Status: ACTIVE | Noted: 2021-04-25

## 2021-04-30 ENCOUNTER — EXTERNAL CHRONIC CARE MANAGEMENT (OUTPATIENT)
Dept: PRIMARY CARE CLINIC | Facility: CLINIC | Age: 86
End: 2021-04-30
Payer: MEDICARE

## 2021-04-30 PROCEDURE — 99490 PR CHRONIC CARE MGMT, 1ST 20 MIN: ICD-10-PCS | Mod: S$PBB,,, | Performed by: EMERGENCY MEDICINE

## 2021-04-30 PROCEDURE — 99490 CHRNC CARE MGMT STAFF 1ST 20: CPT | Mod: PBBFAC,PO | Performed by: EMERGENCY MEDICINE

## 2021-04-30 PROCEDURE — 99490 CHRNC CARE MGMT STAFF 1ST 20: CPT | Mod: S$PBB,,, | Performed by: EMERGENCY MEDICINE

## 2021-05-13 ENCOUNTER — TELEPHONE (OUTPATIENT)
Dept: OPHTHALMOLOGY | Facility: CLINIC | Age: 86
End: 2021-05-13

## 2021-05-24 PROBLEM — I50.33 ACUTE ON CHRONIC DIASTOLIC HEART FAILURE: Status: ACTIVE | Noted: 2021-05-24

## 2021-05-24 PROBLEM — E87.5 HYPERKALEMIA: Status: ACTIVE | Noted: 2021-05-24

## 2021-05-24 PROBLEM — E87.1 HYPONATREMIA: Status: ACTIVE | Noted: 2021-05-24

## 2021-05-24 PROBLEM — I50.9 ACUTE ON CHRONIC CONGESTIVE HEART FAILURE: Status: ACTIVE | Noted: 2021-05-24

## 2021-05-24 PROBLEM — J44.1 COPD WITH ACUTE EXACERBATION: Status: ACTIVE | Noted: 2021-05-24

## 2021-05-26 RX ORDER — LISINOPRIL 2.5 MG/1
TABLET ORAL
Qty: 90 TABLET | Refills: 1 | OUTPATIENT
Start: 2021-05-26 | End: 2021-05-31

## 2021-05-29 PROBLEM — R40.0 DROWSY: Status: ACTIVE | Noted: 2021-05-29

## 2021-05-30 PROBLEM — R65.21 SEPTIC SHOCK: Status: ACTIVE | Noted: 2021-05-30

## 2021-05-30 PROBLEM — A41.9 SEPTIC SHOCK: Status: ACTIVE | Noted: 2021-05-30

## 2021-05-30 PROBLEM — R40.0 DROWSY: Status: RESOLVED | Noted: 2021-05-29 | Resolved: 2021-05-30

## 2021-05-31 ENCOUNTER — EXTERNAL CHRONIC CARE MANAGEMENT (OUTPATIENT)
Dept: PRIMARY CARE CLINIC | Facility: CLINIC | Age: 86
End: 2021-05-31
Payer: MEDICARE

## 2021-05-31 PROCEDURE — 99490 PR CHRONIC CARE MGMT, 1ST 20 MIN: ICD-10-PCS | Mod: S$PBB,,, | Performed by: EMERGENCY MEDICINE

## 2021-05-31 PROCEDURE — 99490 CHRNC CARE MGMT STAFF 1ST 20: CPT | Mod: PBBFAC,PO | Performed by: EMERGENCY MEDICINE

## 2021-05-31 PROCEDURE — 99490 CHRNC CARE MGMT STAFF 1ST 20: CPT | Mod: S$PBB,,, | Performed by: EMERGENCY MEDICINE

## 2021-06-19 ENCOUNTER — PATIENT MESSAGE (OUTPATIENT)
Dept: OPHTHALMOLOGY | Facility: CLINIC | Age: 86
End: 2021-06-19

## 2021-06-19 ENCOUNTER — PATIENT MESSAGE (OUTPATIENT)
Dept: FAMILY MEDICINE | Facility: CLINIC | Age: 86
End: 2021-06-19

## 2021-06-30 ENCOUNTER — EXTERNAL CHRONIC CARE MANAGEMENT (OUTPATIENT)
Dept: PRIMARY CARE CLINIC | Facility: CLINIC | Age: 86
End: 2021-06-30
Payer: MEDICARE

## 2021-06-30 PROCEDURE — 99490 CHRNC CARE MGMT STAFF 1ST 20: CPT | Mod: S$PBB,,, | Performed by: EMERGENCY MEDICINE

## 2021-06-30 PROCEDURE — 99490 CHRNC CARE MGMT STAFF 1ST 20: CPT | Mod: PBBFAC,PO | Performed by: EMERGENCY MEDICINE

## 2021-06-30 PROCEDURE — 99490 PR CHRONIC CARE MGMT, 1ST 20 MIN: ICD-10-PCS | Mod: S$PBB,,, | Performed by: EMERGENCY MEDICINE

## 2023-02-21 NOTE — TELEPHONE ENCOUNTER
Left message that forms were received.   Implemented All Universal Safety Interventions:  [place] to call system. Call bell, personal items and telephone within reach. Instruct patient to call for assistance. Room bathroom lighting operational. Non-slip footwear when patient is off stretcher. Physically safe environment: no spills, clutter or unnecessary equipment. Stretcher in lowest position, wheels locked, appropriate side rails in place.

## 2024-07-15 NOTE — Clinical Note
Could you please check with her sometime this week to see if her rashes any better with the topical approach using Lotrisone.  I would expect to be better, perhaps not totally gone.Gurjit Ma MD D/I/A:  TR band removed without issue. Primapore intact. Reverse barbeau normal. RIJ site has remained stable. Patient is tolerating liquids and foods, ambulating and patient has a .  SB on monitor.  IV access removed.  Education completed and outlined in AVS.  Questions answered prior to discharge.  Belongings returned to patient at discharge.    P: Discharged to home with son as .